# Patient Record
Sex: FEMALE | Race: WHITE | Employment: FULL TIME | URBAN - METROPOLITAN AREA
[De-identification: names, ages, dates, MRNs, and addresses within clinical notes are randomized per-mention and may not be internally consistent; named-entity substitution may affect disease eponyms.]

---

## 2017-01-16 RX ORDER — IBUPROFEN 800 MG/1
TABLET ORAL
Qty: 270 TABLET | Refills: 1 | Status: SHIPPED | OUTPATIENT
Start: 2017-01-16 | End: 2018-02-18 | Stop reason: SDUPTHER

## 2017-01-21 DIAGNOSIS — K21.9 GASTROESOPHAGEAL REFLUX DISEASE WITHOUT ESOPHAGITIS: ICD-10-CM

## 2017-01-23 RX ORDER — OMEPRAZOLE 40 MG/1
CAPSULE, DELAYED RELEASE ORAL
Qty: 90 CAPSULE | Refills: 2 | Status: SHIPPED | OUTPATIENT
Start: 2017-01-23 | End: 2017-10-18 | Stop reason: SDUPTHER

## 2017-03-22 ENCOUNTER — TELEPHONE (OUTPATIENT)
Dept: FAMILY MEDICINE CLINIC | Age: 47
End: 2017-03-22

## 2017-03-22 RX ORDER — KETOCONAZOLE 20 MG/G
CREAM TOPICAL
Qty: 30 G | Refills: 1 | Status: SHIPPED | OUTPATIENT
Start: 2017-03-22 | End: 2019-03-04 | Stop reason: SDUPTHER

## 2017-04-10 DIAGNOSIS — G25.81 RESTLESS LEG SYNDROME: ICD-10-CM

## 2017-04-10 RX ORDER — CLONAZEPAM 2 MG/1
2 TABLET ORAL NIGHTLY PRN
Qty: 90 TABLET | OUTPATIENT
Start: 2017-04-10

## 2017-04-17 ENCOUNTER — OFFICE VISIT (OUTPATIENT)
Dept: FAMILY MEDICINE CLINIC | Age: 47
End: 2017-04-17

## 2017-04-17 VITALS
HEART RATE: 70 BPM | OXYGEN SATURATION: 98 % | SYSTOLIC BLOOD PRESSURE: 140 MMHG | BODY MASS INDEX: 36.7 KG/M2 | WEIGHT: 210.5 LBS | RESPIRATION RATE: 16 BRPM | DIASTOLIC BLOOD PRESSURE: 86 MMHG

## 2017-04-17 DIAGNOSIS — F33.0 MILD EPISODE OF RECURRENT MAJOR DEPRESSIVE DISORDER (HCC): Primary | ICD-10-CM

## 2017-04-17 DIAGNOSIS — F41.9 ANXIETY: ICD-10-CM

## 2017-04-17 DIAGNOSIS — E66.01 MORBID OBESITY DUE TO EXCESS CALORIES (HCC): ICD-10-CM

## 2017-04-17 DIAGNOSIS — G25.81 RESTLESS LEG SYNDROME: ICD-10-CM

## 2017-04-17 PROCEDURE — 99214 OFFICE O/P EST MOD 30 MIN: CPT | Performed by: FAMILY MEDICINE

## 2017-04-17 RX ORDER — CLONAZEPAM 2 MG/1
2 TABLET ORAL NIGHTLY PRN
Qty: 90 TABLET | Refills: 1 | Status: SHIPPED | OUTPATIENT
Start: 2017-04-17 | End: 2017-10-11 | Stop reason: SDUPTHER

## 2017-06-04 ENCOUNTER — TELEPHONE (OUTPATIENT)
Dept: BARIATRICS/WEIGHT MGMT | Age: 47
End: 2017-06-04

## 2017-07-13 RX ORDER — SERTRALINE HYDROCHLORIDE 100 MG/1
TABLET, FILM COATED ORAL
Qty: 135 TABLET | Refills: 1 | Status: SHIPPED | OUTPATIENT
Start: 2017-07-13 | End: 2018-02-18 | Stop reason: SDUPTHER

## 2017-08-10 ENCOUNTER — TELEPHONE (OUTPATIENT)
Dept: FAMILY MEDICINE CLINIC | Age: 47
End: 2017-08-10

## 2017-08-10 RX ORDER — DESVENLAFAXINE 100 MG/1
100 TABLET, EXTENDED RELEASE ORAL DAILY
Qty: 30 TABLET | Refills: 3 | Status: SHIPPED | OUTPATIENT
Start: 2017-08-10 | End: 2017-08-21

## 2017-08-21 ENCOUNTER — TELEPHONE (OUTPATIENT)
Dept: FAMILY MEDICINE CLINIC | Age: 47
End: 2017-08-21

## 2017-09-18 ENCOUNTER — TELEPHONE (OUTPATIENT)
Dept: FAMILY MEDICINE CLINIC | Age: 47
End: 2017-09-18

## 2017-09-18 DIAGNOSIS — Z11.3 SCREENING FOR STD (SEXUALLY TRANSMITTED DISEASE): Primary | ICD-10-CM

## 2017-09-26 ENCOUNTER — TELEPHONE (OUTPATIENT)
Dept: FAMILY MEDICINE CLINIC | Age: 47
End: 2017-09-26

## 2017-10-05 LAB
A/G RATIO: 1.6 (CALC) (ref 1–2.5)
ALBUMIN SERPL-MCNC: 3.9 G/DL (ref 3.6–5.1)
ALP BLD-CCNC: 71 U/L (ref 33–115)
ALT SERPL-CCNC: 12 U/L (ref 6–29)
AST SERPL-CCNC: 15 U/L (ref 10–35)
BASOPHILS ABSOLUTE: 78 CELLS/UL (ref 0–200)
BASOPHILS RELATIVE PERCENT: 1 %
BILIRUB SERPL-MCNC: 0.3 MG/DL (ref 0.2–1.2)
BUN / CREAT RATIO: NORMAL (CALC) (ref 6–22)
BUN BLDV-MCNC: 16 MG/DL (ref 7–25)
CALCIUM SERPL-MCNC: 9.3 MG/DL (ref 8.6–10.2)
CHLORIDE BLD-SCNC: 108 MMOL/L (ref 98–110)
CHOLESTEROL, TOTAL: 230 MG/DL
CHOLESTEROL/HDL RATIO: 2.2 (CALC)
CHOLESTEROL: 125 MG/DL (CALC)
CO2: 28 MMOL/L (ref 20–31)
CREAT SERPL-MCNC: 0.73 MG/DL (ref 0.5–1.1)
EOSINOPHILS ABSOLUTE: 140 CELLS/UL (ref 15–500)
EOSINOPHILS RELATIVE PERCENT: 1.8 %
GFR AFRICAN AMERICAN: 112 ML/MIN/1.73M2
GFR SERPL CREATININE-BSD FRML MDRD: 97 ML/MIN/1.73M2
GLOBULIN: 2.5 G/DL (CALC) (ref 1.9–3.7)
GLUCOSE BLD-MCNC: 95 MG/DL (ref 65–99)
HCT VFR BLD CALC: 36.9 % (ref 35–45)
HDLC SERPL-MCNC: 105 MG/DL
HEMOGLOBIN: 11.8 G/DL (ref 11.7–15.5)
HIV 1+2 AB+HIV1P24 AG, EIA: NORMAL
LDL CHOLESTEROL CALCULATED: 106 MG/DL (CALC)
LYMPHOCYTES ABSOLUTE: 2535 CELLS/UL (ref 850–3900)
LYMPHOCYTES RELATIVE PERCENT: 32.5 %
MCH RBC QN AUTO: 28.6 PG (ref 27–33)
MCHC RBC AUTO-ENTMCNC: 32 G/DL (ref 32–36)
MCV RBC AUTO: 89.6 FL (ref 80–100)
MONOCYTES ABSOLUTE: 562 CELLS/UL (ref 200–950)
MONOCYTES RELATIVE PERCENT: 7.2 %
NEUTROPHILS ABSOLUTE: 4485 CELLS/UL (ref 1500–7800)
PDW BLD-RTO: 13.2 % (ref 11–15)
PLATELET # BLD: 206 THOUSAND/UL (ref 140–400)
PMV BLD AUTO: 11 FL (ref 7.5–12.5)
POTASSIUM SERPL-SCNC: 3.7 MMOL/L (ref 3.5–5.3)
RBC # BLD: 4.12 MILLION/UL (ref 3.8–5.1)
RPR: NORMAL
SEGMENTED NEUTROPHILS RELATIVE PERCENT: 57.5 %
SODIUM BLD-SCNC: 141 MMOL/L (ref 135–146)
TOTAL PROTEIN: 6.4 G/DL (ref 6.1–8.1)
TRIGL SERPL-MCNC: 95 MG/DL
TSH ULTRASENSITIVE: 2.29 MIU/L
WBC # BLD: 7.8 THOUSAND/UL (ref 3.8–10.8)

## 2017-10-11 ENCOUNTER — TELEPHONE (OUTPATIENT)
Dept: FAMILY MEDICINE CLINIC | Age: 47
End: 2017-10-11

## 2017-10-11 DIAGNOSIS — G25.81 RESTLESS LEG SYNDROME: ICD-10-CM

## 2017-10-11 RX ORDER — CLONAZEPAM 2 MG/1
2 TABLET ORAL NIGHTLY PRN
Qty: 30 TABLET | Refills: 0 | OUTPATIENT
Start: 2017-10-11 | End: 2017-10-18 | Stop reason: SDUPTHER

## 2017-10-18 ENCOUNTER — OFFICE VISIT (OUTPATIENT)
Dept: FAMILY MEDICINE CLINIC | Age: 47
End: 2017-10-18

## 2017-10-18 VITALS
WEIGHT: 225.5 LBS | HEART RATE: 82 BPM | RESPIRATION RATE: 12 BRPM | OXYGEN SATURATION: 97 % | DIASTOLIC BLOOD PRESSURE: 70 MMHG | HEIGHT: 64 IN | BODY MASS INDEX: 38.5 KG/M2 | SYSTOLIC BLOOD PRESSURE: 128 MMHG

## 2017-10-18 DIAGNOSIS — K21.9 GASTROESOPHAGEAL REFLUX DISEASE WITHOUT ESOPHAGITIS: ICD-10-CM

## 2017-10-18 DIAGNOSIS — G25.81 RESTLESS LEG SYNDROME: ICD-10-CM

## 2017-10-18 DIAGNOSIS — F41.9 ANXIETY: Primary | ICD-10-CM

## 2017-10-18 PROCEDURE — 99214 OFFICE O/P EST MOD 30 MIN: CPT | Performed by: FAMILY MEDICINE

## 2017-10-18 RX ORDER — OMEPRAZOLE 40 MG/1
CAPSULE, DELAYED RELEASE ORAL
Qty: 90 CAPSULE | Refills: 3 | Status: SHIPPED | OUTPATIENT
Start: 2017-10-18 | End: 2018-09-19 | Stop reason: SDUPTHER

## 2017-10-18 RX ORDER — CLONAZEPAM 2 MG/1
2 TABLET ORAL NIGHTLY
Qty: 90 TABLET | Refills: 1 | Status: SHIPPED | OUTPATIENT
Start: 2017-10-18 | End: 2018-01-24 | Stop reason: SDUPTHER

## 2017-10-18 ASSESSMENT — PATIENT HEALTH QUESTIONNAIRE - PHQ9
SUM OF ALL RESPONSES TO PHQ QUESTIONS 1-9: 0
SUM OF ALL RESPONSES TO PHQ9 QUESTIONS 1 & 2: 0
1. LITTLE INTEREST OR PLEASURE IN DOING THINGS: 0
2. FEELING DOWN, DEPRESSED OR HOPELESS: 0

## 2017-10-18 NOTE — PROGRESS NOTES
Antibiotics Itching, Nausea Only and Rash       Social History   Substance Use Topics    Smoking status: Former Smoker     Years: 13.00     Quit date: 3/28/2012    Smokeless tobacco: Never Used    Alcohol use No       /70 (Site: Left Arm, Position: Sitting, Cuff Size: Large Adult)   Pulse 82   Resp 12   Ht 5' 3.75\" (1.619 m)   Wt 225 lb 8 oz (102.3 kg)   SpO2 97%   BMI 39.01 kg/m²     Objective:   Physical Exam   Constitutional: She appears well-developed and well-nourished. She is cooperative. No distress. Neck: Carotid bruit is not present. No thyromegaly present. Cardiovascular: Normal rate, regular rhythm and normal heart sounds. No murmur heard. Pulses:       Dorsalis pedis pulses are 2+ on the right side, and 2+ on the left side. Posterior tibial pulses are 2+ on the right side, and 2+ on the left side. Pulmonary/Chest: Effort normal and breath sounds normal.   Musculoskeletal: Normal range of motion. She exhibits no edema or tenderness. Neurological: She is alert. Psychiatric: She has a normal mood and affect. Her speech is normal and behavior is normal. Judgment and thought content normal. Cognition and memory are normal.       Assessment:      Velia Nolasco was seen today for follow-up. Diagnoses and all orders for this visit:    Anxiety    Gastroesophageal reflux disease without esophagitis  -     omeprazole (PRILOSEC) 40 MG delayed release capsule; TAKE 1 CAPSULE DAILY    Restless leg syndrome  -     clonazePAM (KLONOPIN) 2 MG tablet; Take 1 tablet by mouth nightly    OARRS report checked          Plan:      Laboratory profile reviewed with patient  Continue current medications  Encourage patient to resume diet and exercise for weight loss  Patient is to consider bariatric evaluation  RTC 6 months  Please note that this chart was generated using Dragon dictation software.  Although every effort was made to ensure the accuracy of this automated transcription, some errors in transcription may have occurred.

## 2018-01-24 ENCOUNTER — OFFICE VISIT (OUTPATIENT)
Dept: FAMILY MEDICINE CLINIC | Age: 48
End: 2018-01-24

## 2018-01-24 VITALS
DIASTOLIC BLOOD PRESSURE: 94 MMHG | OXYGEN SATURATION: 99 % | HEART RATE: 74 BPM | TEMPERATURE: 100.8 F | SYSTOLIC BLOOD PRESSURE: 138 MMHG

## 2018-01-24 DIAGNOSIS — J11.1 INFLUENZA: Primary | ICD-10-CM

## 2018-01-24 DIAGNOSIS — G25.81 RESTLESS LEG SYNDROME: ICD-10-CM

## 2018-01-24 PROCEDURE — 99213 OFFICE O/P EST LOW 20 MIN: CPT | Performed by: FAMILY MEDICINE

## 2018-01-24 RX ORDER — CLONAZEPAM 2 MG/1
2 TABLET ORAL NIGHTLY
Qty: 90 TABLET | Refills: 0 | Status: SHIPPED | OUTPATIENT
Start: 2018-01-24 | End: 2018-04-18 | Stop reason: SDUPTHER

## 2018-01-24 ASSESSMENT — ENCOUNTER SYMPTOMS: COUGH: 1

## 2018-02-19 RX ORDER — IBUPROFEN 800 MG/1
TABLET ORAL
Qty: 270 TABLET | Refills: 1 | Status: SHIPPED | OUTPATIENT
Start: 2018-02-19 | End: 2019-03-01 | Stop reason: SDUPTHER

## 2018-02-19 RX ORDER — SERTRALINE HYDROCHLORIDE 100 MG/1
TABLET, FILM COATED ORAL
Qty: 135 TABLET | Refills: 1 | Status: SHIPPED | OUTPATIENT
Start: 2018-02-19 | End: 2018-04-18 | Stop reason: SDUPTHER

## 2018-02-27 ENCOUNTER — HOSPITAL ENCOUNTER (OUTPATIENT)
Dept: OTHER | Age: 48
Discharge: HOME | End: 2018-02-27
Attending: FAMILY MEDICINE | Admitting: FAMILY MEDICINE

## 2018-02-27 ENCOUNTER — OFFICE VISIT (OUTPATIENT)
Dept: FAMILY MEDICINE CLINIC | Age: 48
End: 2018-02-27

## 2018-02-27 VITALS
WEIGHT: 244.38 LBS | SYSTOLIC BLOOD PRESSURE: 126 MMHG | OXYGEN SATURATION: 98 % | DIASTOLIC BLOOD PRESSURE: 82 MMHG | RESPIRATION RATE: 16 BRPM | HEART RATE: 74 BPM | BODY MASS INDEX: 42.28 KG/M2

## 2018-02-27 DIAGNOSIS — M79.672 PAIN IN LEFT FOOT: ICD-10-CM

## 2018-02-27 DIAGNOSIS — M79.672 LEFT FOOT PAIN: Primary | ICD-10-CM

## 2018-02-27 PROCEDURE — 99213 OFFICE O/P EST LOW 20 MIN: CPT | Performed by: FAMILY MEDICINE

## 2018-03-01 ENCOUNTER — TELEPHONE (OUTPATIENT)
Dept: FAMILY MEDICINE CLINIC | Age: 48
End: 2018-03-01

## 2018-04-02 ENCOUNTER — TELEPHONE (OUTPATIENT)
Dept: FAMILY MEDICINE CLINIC | Age: 48
End: 2018-04-02

## 2018-04-02 DIAGNOSIS — S92.345D CLOSED NONDISPLACED FRACTURE OF FOURTH METATARSAL BONE OF LEFT FOOT WITH ROUTINE HEALING, SUBSEQUENT ENCOUNTER: Primary | ICD-10-CM

## 2018-04-04 ENCOUNTER — HOSPITAL ENCOUNTER (OUTPATIENT)
Dept: OTHER | Age: 48
Discharge: HOME | End: 2018-04-04
Attending: FAMILY MEDICINE | Admitting: FAMILY MEDICINE

## 2018-04-04 DIAGNOSIS — S92.345D CLOSED NONDISPLACED FRACTURE OF FOURTH METATARSAL BONE OF LEFT FOOT WITH ROUTINE HEALING, SUBSEQUENT ENCOUNTER: ICD-10-CM

## 2018-04-05 ENCOUNTER — TELEPHONE (OUTPATIENT)
Dept: FAMILY MEDICINE CLINIC | Age: 48
End: 2018-04-05

## 2018-04-05 DIAGNOSIS — M79.672 LEFT FOOT PAIN: Primary | ICD-10-CM

## 2018-04-18 ENCOUNTER — OFFICE VISIT (OUTPATIENT)
Dept: FAMILY MEDICINE CLINIC | Age: 48
End: 2018-04-18

## 2018-04-18 VITALS
SYSTOLIC BLOOD PRESSURE: 132 MMHG | OXYGEN SATURATION: 98 % | WEIGHT: 241 LBS | BODY MASS INDEX: 41.69 KG/M2 | HEART RATE: 90 BPM | DIASTOLIC BLOOD PRESSURE: 86 MMHG

## 2018-04-18 DIAGNOSIS — F41.9 ANXIETY: ICD-10-CM

## 2018-04-18 DIAGNOSIS — F33.0 MILD EPISODE OF RECURRENT MAJOR DEPRESSIVE DISORDER (HCC): Primary | ICD-10-CM

## 2018-04-18 DIAGNOSIS — B37.2 MONILIAL INTERTRIGO: ICD-10-CM

## 2018-04-18 DIAGNOSIS — M72.2 PLANTAR FASCIITIS: ICD-10-CM

## 2018-04-18 DIAGNOSIS — G25.81 RESTLESS LEG SYNDROME: ICD-10-CM

## 2018-04-18 PROCEDURE — 99214 OFFICE O/P EST MOD 30 MIN: CPT | Performed by: FAMILY MEDICINE

## 2018-04-18 RX ORDER — MELOXICAM 15 MG/1
15 TABLET ORAL DAILY
Qty: 30 TABLET | Refills: 1 | Status: SHIPPED | OUTPATIENT
Start: 2018-04-18 | End: 2019-03-29

## 2018-04-18 RX ORDER — CLONAZEPAM 2 MG/1
2 TABLET ORAL NIGHTLY
Qty: 90 TABLET | Refills: 0 | Status: SHIPPED | OUTPATIENT
Start: 2018-04-18 | End: 2018-10-17 | Stop reason: SDUPTHER

## 2018-04-18 RX ORDER — SERTRALINE HYDROCHLORIDE 100 MG/1
200 TABLET, FILM COATED ORAL DAILY
Qty: 180 TABLET | Refills: 1 | Status: SHIPPED | OUTPATIENT
Start: 2018-04-18 | End: 2018-12-30 | Stop reason: SDUPTHER

## 2018-09-19 DIAGNOSIS — K21.9 GASTROESOPHAGEAL REFLUX DISEASE WITHOUT ESOPHAGITIS: ICD-10-CM

## 2018-09-19 RX ORDER — OMEPRAZOLE 40 MG/1
CAPSULE, DELAYED RELEASE ORAL
Qty: 90 CAPSULE | Refills: 0 | Status: SHIPPED | OUTPATIENT
Start: 2018-09-19 | End: 2018-12-18 | Stop reason: SDUPTHER

## 2018-10-17 ENCOUNTER — OFFICE VISIT (OUTPATIENT)
Dept: FAMILY MEDICINE CLINIC | Age: 48
End: 2018-10-17
Payer: COMMERCIAL

## 2018-10-17 ENCOUNTER — TELEPHONE (OUTPATIENT)
Dept: FAMILY MEDICINE CLINIC | Age: 48
End: 2018-10-17

## 2018-10-17 VITALS
DIASTOLIC BLOOD PRESSURE: 84 MMHG | BODY MASS INDEX: 43.6 KG/M2 | HEART RATE: 86 BPM | WEIGHT: 252 LBS | OXYGEN SATURATION: 98 % | SYSTOLIC BLOOD PRESSURE: 120 MMHG

## 2018-10-17 DIAGNOSIS — L82.1 SEBORRHEIC KERATOSIS: ICD-10-CM

## 2018-10-17 DIAGNOSIS — F41.9 ANXIETY: Primary | ICD-10-CM

## 2018-10-17 DIAGNOSIS — E66.01 CLASS 3 SEVERE OBESITY DUE TO EXCESS CALORIES WITHOUT SERIOUS COMORBIDITY WITH BODY MASS INDEX (BMI) OF 40.0 TO 44.9 IN ADULT (HCC): ICD-10-CM

## 2018-10-17 DIAGNOSIS — G25.81 RESTLESS LEG SYNDROME: ICD-10-CM

## 2018-10-17 PROCEDURE — 99214 OFFICE O/P EST MOD 30 MIN: CPT | Performed by: FAMILY MEDICINE

## 2018-10-17 RX ORDER — CLONAZEPAM 2 MG/1
2 TABLET ORAL NIGHTLY
Qty: 90 TABLET | Refills: 1 | Status: SHIPPED | OUTPATIENT
Start: 2018-10-17 | End: 2018-10-18 | Stop reason: SDUPTHER

## 2018-10-17 RX ORDER — BUPROPION HYDROCHLORIDE 150 MG/1
150 TABLET ORAL EVERY MORNING
Qty: 90 TABLET | Refills: 1 | Status: SHIPPED | OUTPATIENT
Start: 2018-10-17 | End: 2018-10-18

## 2018-10-17 NOTE — TELEPHONE ENCOUNTER
buPROPion (WELLBUTRIN XL) 150 MG extended release tablet 90 tablet 1 10/17/2018     Sig - Route: Take 1 tablet by mouth every morning - Oral          Juventino Kaiser from [address] states that pt has had bariatric surgery and may have problem with absorption of above med. Would like call back to discuss.       P:1- [phone]    Ref #[id number]

## 2018-10-18 DIAGNOSIS — G25.81 RESTLESS LEG SYNDROME: ICD-10-CM

## 2018-10-18 RX ORDER — CLONAZEPAM 2 MG/1
2 TABLET ORAL NIGHTLY
Qty: 90 TABLET | Refills: 1 | Status: SHIPPED | OUTPATIENT
Start: 2018-10-18 | End: 2019-03-29 | Stop reason: SDUPTHER

## 2018-10-18 RX ORDER — BUPROPION HYDROCHLORIDE 75 MG/1
75 TABLET ORAL 2 TIMES DAILY
Qty: 180 TABLET | Refills: 3 | Status: SHIPPED | OUTPATIENT
Start: 2018-10-18 | End: 2019-09-10 | Stop reason: SDUPTHER

## 2018-10-18 NOTE — TELEPHONE ENCOUNTER
Disp Refills Start End    clonazePAM (KLONOPIN) 2 MG tablet 90 tablet 1 10/17/2018 1/15/2019    Sig - Route: Take 1 tablet by mouth nightly for 90 days. . - Oral      [facility]

## 2018-10-19 DIAGNOSIS — G25.81 RESTLESS LEG SYNDROME: ICD-10-CM

## 2018-12-18 DIAGNOSIS — K21.9 GASTROESOPHAGEAL REFLUX DISEASE WITHOUT ESOPHAGITIS: ICD-10-CM

## 2018-12-18 RX ORDER — OMEPRAZOLE 40 MG/1
CAPSULE, DELAYED RELEASE ORAL
Qty: 90 CAPSULE | Refills: 0 | Status: SHIPPED | OUTPATIENT
Start: 2018-12-18 | End: 2019-03-18 | Stop reason: SDUPTHER

## 2018-12-31 RX ORDER — SERTRALINE HYDROCHLORIDE 100 MG/1
TABLET, FILM COATED ORAL
Qty: 180 TABLET | Refills: 1 | Status: SHIPPED | OUTPATIENT
Start: 2018-12-31 | End: 2019-03-29 | Stop reason: SDUPTHER

## 2019-03-04 RX ORDER — IBUPROFEN 800 MG/1
TABLET ORAL
Qty: 270 TABLET | Refills: 0 | Status: SHIPPED | OUTPATIENT
Start: 2019-03-04 | End: 2019-08-01 | Stop reason: ALTCHOICE

## 2019-03-05 RX ORDER — KETOCONAZOLE 20 MG/G
CREAM TOPICAL
Qty: 15 G | Refills: 0 | Status: SHIPPED | OUTPATIENT
Start: 2019-03-05 | End: 2019-08-28 | Stop reason: SDUPTHER

## 2019-03-11 DIAGNOSIS — G25.81 RESTLESS LEG SYNDROME: ICD-10-CM

## 2019-03-12 RX ORDER — CLONAZEPAM 2 MG/1
2 TABLET ORAL NIGHTLY
Qty: 90 TABLET | Refills: 0 | OUTPATIENT
Start: 2019-03-12 | End: 2019-06-10

## 2019-03-18 DIAGNOSIS — K21.9 GASTROESOPHAGEAL REFLUX DISEASE WITHOUT ESOPHAGITIS: ICD-10-CM

## 2019-03-19 RX ORDER — OMEPRAZOLE 40 MG/1
CAPSULE, DELAYED RELEASE ORAL
Qty: 90 CAPSULE | Refills: 0 | Status: SHIPPED | OUTPATIENT
Start: 2019-03-19 | End: 2019-06-16 | Stop reason: SDUPTHER

## 2019-03-29 ENCOUNTER — OFFICE VISIT (OUTPATIENT)
Dept: FAMILY MEDICINE CLINIC | Age: 49
End: 2019-03-29
Payer: COMMERCIAL

## 2019-03-29 VITALS
WEIGHT: 258.5 LBS | SYSTOLIC BLOOD PRESSURE: 142 MMHG | RESPIRATION RATE: 16 BRPM | OXYGEN SATURATION: 98 % | DIASTOLIC BLOOD PRESSURE: 90 MMHG | BODY MASS INDEX: 44.72 KG/M2 | HEART RATE: 75 BPM

## 2019-03-29 DIAGNOSIS — F33.0 MILD EPISODE OF RECURRENT MAJOR DEPRESSIVE DISORDER (HCC): ICD-10-CM

## 2019-03-29 DIAGNOSIS — G25.81 RESTLESS LEG SYNDROME: Primary | ICD-10-CM

## 2019-03-29 DIAGNOSIS — S86.912A KNEE STRAIN, LEFT, INITIAL ENCOUNTER: ICD-10-CM

## 2019-03-29 DIAGNOSIS — B37.2 MONILIAL INTERTRIGO: ICD-10-CM

## 2019-03-29 PROCEDURE — 99214 OFFICE O/P EST MOD 30 MIN: CPT | Performed by: FAMILY MEDICINE

## 2019-03-29 RX ORDER — CLONAZEPAM 2 MG/1
2 TABLET ORAL NIGHTLY
Qty: 90 TABLET | Refills: 0 | Status: SHIPPED | OUTPATIENT
Start: 2019-03-29 | End: 2019-06-19 | Stop reason: SDUPTHER

## 2019-03-29 RX ORDER — SERTRALINE HYDROCHLORIDE 100 MG/1
TABLET, FILM COATED ORAL
Qty: 180 TABLET | Refills: 1 | Status: SHIPPED | OUTPATIENT
Start: 2019-03-29 | End: 2020-08-04 | Stop reason: SDUPTHER

## 2019-04-04 DIAGNOSIS — D50.9 IRON DEFICIENCY ANEMIA, UNSPECIFIED IRON DEFICIENCY ANEMIA TYPE: Primary | ICD-10-CM

## 2019-04-04 LAB
A/G RATIO: 1.3 (CALC) (ref 1–2.5)
ALBUMIN SERPL-MCNC: 3.8 G/DL (ref 3.6–5.1)
ALP BLD-CCNC: 74 U/L (ref 33–130)
ALT SERPL-CCNC: 10 U/L (ref 6–29)
AST SERPL-CCNC: 15 U/L (ref 10–35)
BILIRUB SERPL-MCNC: 0.3 MG/DL (ref 0.2–1.2)
BUN / CREAT RATIO: NORMAL (CALC) (ref 6–22)
BUN BLDV-MCNC: 13 MG/DL (ref 7–25)
CALCIUM SERPL-MCNC: 8.9 MG/DL (ref 8.6–10.4)
CHLORIDE BLD-SCNC: 104 MMOL/L (ref 98–110)
CHOLESTEROL, TOTAL: 217 MG/DL
CHOLESTEROL/HDL RATIO: 2.3 (CALC)
CHOLESTEROL: 124 MG/DL (CALC)
CO2: 26 MMOL/L (ref 20–32)
CREAT SERPL-MCNC: 0.8 MG/DL (ref 0.5–1.05)
FERRITIN: 9 NG/ML (ref 10–232)
GFR AFRICAN AMERICAN: 100 ML/MIN/1.73M2
GFR, ESTIMATED: 86 ML/MIN/1.73M2
GLOBULIN: 3 G/DL (CALC) (ref 1.9–3.7)
GLUCOSE BLD-MCNC: 91 MG/DL (ref 65–99)
HCT VFR BLD CALC: 37.1 % (ref 35–45)
HDLC SERPL-MCNC: 93 MG/DL
HEMOGLOBIN: 11.4 G/DL (ref 11.7–15.5)
HIV 1+2 AB+HIV1P24 AG, EIA: NORMAL
IRON SATURATION: 7 % (CALC) (ref 11–50)
IRON: 35 MCG/DL (ref 45–160)
LDL CHOLESTEROL CALCULATED: 100 MG/DL (CALC)
MCH RBC QN AUTO: 24.5 PG (ref 27–33)
MCHC RBC AUTO-ENTMCNC: 30.7 G/DL (ref 32–36)
MCV RBC AUTO: 79.8 FL (ref 80–100)
PDW BLD-RTO: 16 % (ref 11–15)
PLATELET # BLD: 260 THOUSAND/UL (ref 140–400)
PMV BLD AUTO: 10.8 FL (ref 7.5–12.5)
POTASSIUM SERPL-SCNC: 3.7 MMOL/L (ref 3.5–5.3)
RBC # BLD: 4.65 MILLION/UL (ref 3.8–5.1)
SODIUM BLD-SCNC: 140 MMOL/L (ref 135–146)
TOTAL IRON BINDING CAPACITY: 503 MCG/DL (CALC) (ref 250–450)
TOTAL PROTEIN: 6.8 G/DL (ref 6.1–8.1)
TRIGL SERPL-MCNC: 139 MG/DL
TSH ULTRASENSITIVE: 3.62 MIU/L
WBC # BLD: 7.9 THOUSAND/UL (ref 3.8–10.8)

## 2019-05-15 ENCOUNTER — TELEPHONE (OUTPATIENT)
Dept: FAMILY MEDICINE CLINIC | Age: 49
End: 2019-05-15

## 2019-05-24 ENCOUNTER — TELEPHONE (OUTPATIENT)
Dept: FAMILY MEDICINE CLINIC | Age: 49
End: 2019-05-24

## 2019-05-24 NOTE — TELEPHONE ENCOUNTER
Patient would like to start taking herbal joint health supplement, glucosamine with tumeric, and wanted to make sure that was okay with her medications. She is at work and says it is okay to leave response on her voice mail.

## 2019-06-16 DIAGNOSIS — K21.9 GASTROESOPHAGEAL REFLUX DISEASE WITHOUT ESOPHAGITIS: ICD-10-CM

## 2019-06-18 RX ORDER — OMEPRAZOLE 40 MG/1
CAPSULE, DELAYED RELEASE ORAL
Qty: 90 CAPSULE | Refills: 1 | Status: SHIPPED | OUTPATIENT
Start: 2019-06-18 | End: 2019-09-11 | Stop reason: SDUPTHER

## 2019-06-19 DIAGNOSIS — G25.81 RESTLESS LEG SYNDROME: ICD-10-CM

## 2019-06-19 RX ORDER — CLONAZEPAM 2 MG/1
2 TABLET ORAL NIGHTLY
Qty: 90 TABLET | Refills: 0 | Status: SHIPPED | OUTPATIENT
Start: 2019-06-19 | End: 2019-08-01

## 2019-06-19 NOTE — TELEPHONE ENCOUNTER
Medication:   Requested Prescriptions     Pending Prescriptions Disp Refills    clonazePAM (KLONOPIN) 2 MG tablet 90 tablet 0     Sig: Take 1 tablet by mouth nightly for 90 days. Last Filled: 3-29    Patient Phone Number: [phone] (home) [phone] (work)    Last appt: 3/29/2019   Next appt: 8/1/2019    Last OARRS:   RX Monitoring 3/29/2019   Attestation The Prescription Monitoring Report for this patient was reviewed today.    Periodic Controlled Substance Monitoring -       Preferred Pharmacy:   [facility] [address], [address] [phone] Brina [place] [phone] [address] [facility] [address]  [place] [address]  Phone: [phone] Fax: [phone] [facility], [address] [facility] [phone] Brina [place] [phone]  [facility] [address]  Phone: [phone] Fax: [phone]

## 2019-06-20 ENCOUNTER — TELEPHONE (OUTPATIENT)
Dept: FAMILY MEDICINE CLINIC | Age: 49
End: 2019-06-20

## 2019-06-20 NOTE — TELEPHONE ENCOUNTER
Herpes is typically only past sexually.   Obviously normal laundry detergent is sufficient washing clothes and towels

## 2019-08-01 ENCOUNTER — OFFICE VISIT (OUTPATIENT)
Dept: FAMILY MEDICINE CLINIC | Age: 49
End: 2019-08-01
Payer: COMMERCIAL

## 2019-08-01 VITALS
HEART RATE: 74 BPM | BODY MASS INDEX: 43.94 KG/M2 | SYSTOLIC BLOOD PRESSURE: 112 MMHG | DIASTOLIC BLOOD PRESSURE: 84 MMHG | HEIGHT: 64 IN | RESPIRATION RATE: 12 BRPM | WEIGHT: 257.38 LBS | OXYGEN SATURATION: 97 %

## 2019-08-01 DIAGNOSIS — G25.81 RESTLESS LEG SYNDROME: Primary | ICD-10-CM

## 2019-08-01 DIAGNOSIS — M23.92 INTERNAL DERANGEMENT OF LEFT KNEE: ICD-10-CM

## 2019-08-01 DIAGNOSIS — F33.0 MILD EPISODE OF RECURRENT MAJOR DEPRESSIVE DISORDER (HCC): ICD-10-CM

## 2019-08-01 DIAGNOSIS — F41.9 ANXIETY: ICD-10-CM

## 2019-08-01 DIAGNOSIS — D50.8 IRON DEFICIENCY ANEMIA SECONDARY TO INADEQUATE DIETARY IRON INTAKE: ICD-10-CM

## 2019-08-01 PROBLEM — D50.9 IRON DEFICIENCY ANEMIA: Status: ACTIVE | Noted: 2019-08-01

## 2019-08-01 PROCEDURE — 99214 OFFICE O/P EST MOD 30 MIN: CPT | Performed by: FAMILY MEDICINE

## 2019-08-01 RX ORDER — FERROUS SULFATE 325(65) MG
325 TABLET ORAL
Qty: 90 TABLET | Refills: 1
Start: 2019-08-01 | End: 2020-08-04 | Stop reason: SDUPTHER

## 2019-08-01 RX ORDER — CLONAZEPAM 2 MG/1
2 TABLET ORAL 2 TIMES DAILY PRN
Qty: 180 TABLET | Refills: 0 | Status: SHIPPED | OUTPATIENT
Start: 2019-08-01 | End: 2019-12-27 | Stop reason: SDUPTHER

## 2019-08-01 NOTE — PROGRESS NOTES
mouth 2 times daily as needed (restless leg syndrome) for up to 90 days. -     Comprehensive Metabolic Panel - Vitros; Future    Iron deficiency anemia secondary to inadequate dietary iron intake  -     CBC; Future  -     Iron and TIBC; Future    Mild episode of recurrent major depressive disorder ([facility] [address]    Anxiety    Internal derangement of left knee    Other orders  -     ferrous sulfate 325 (65 Fe) MG tablet; Take 1 tablet by mouth daily (with breakfast)    OARRS report checked          Plan:      Laboratory profile reviewed with patient demonstrating i improvement of iron deficiency anemia. .  We will see how she response to the oral iron and decrease his down to once a day. In regards to depression she is doing overall okay with her current medications and she does not want any additional medications. She is already working on a plan in regards and shipped to her daughter. Continue with orthopedic care regards to the left knee  RTC in 6 months or sooner if needs refill of the benzodiazepine medication    Please note that this chart was generated using Dragon dictation software. Although every effort was made to ensure the accuracy of this automated transcription, some errors in transcription may have occurred.

## 2019-08-28 ENCOUNTER — TELEPHONE (OUTPATIENT)
Dept: FAMILY MEDICINE CLINIC | Age: 49
End: 2019-08-28

## 2019-08-28 RX ORDER — KETOCONAZOLE 20 MG/G
CREAM TOPICAL
Qty: 15 G | Refills: 0 | Status: SHIPPED | OUTPATIENT
Start: 2019-08-28 | End: 2019-10-07 | Stop reason: SDUPTHER

## 2019-08-28 RX ORDER — SUCRALFATE 1 G/1
1 TABLET ORAL 4 TIMES DAILY
Qty: 120 TABLET | Refills: 3 | Status: SHIPPED | OUTPATIENT
Start: 2019-08-28 | End: 2019-10-25 | Stop reason: SDUPTHER

## 2019-09-10 RX ORDER — BUPROPION HYDROCHLORIDE 75 MG/1
TABLET ORAL
Qty: 180 TABLET | Refills: 1 | Status: SHIPPED | OUTPATIENT
Start: 2019-09-10 | End: 2020-02-04 | Stop reason: SDUPTHER

## 2019-09-11 DIAGNOSIS — K21.9 GASTROESOPHAGEAL REFLUX DISEASE WITHOUT ESOPHAGITIS: ICD-10-CM

## 2019-09-11 RX ORDER — OMEPRAZOLE 40 MG/1
CAPSULE, DELAYED RELEASE ORAL
Qty: 60 CAPSULE | Refills: 1 | Status: SHIPPED | OUTPATIENT
Start: 2019-09-11 | End: 2019-12-26

## 2019-10-07 ENCOUNTER — OFFICE VISIT (OUTPATIENT)
Dept: FAMILY MEDICINE CLINIC | Age: 49
End: 2019-10-07
Payer: COMMERCIAL

## 2019-10-07 VITALS
SYSTOLIC BLOOD PRESSURE: 142 MMHG | DIASTOLIC BLOOD PRESSURE: 94 MMHG | HEART RATE: 88 BPM | TEMPERATURE: 98.7 F | OXYGEN SATURATION: 97 %

## 2019-10-07 DIAGNOSIS — J20.9 ACUTE BRONCHITIS, UNSPECIFIED ORGANISM: Primary | ICD-10-CM

## 2019-10-07 PROCEDURE — 99213 OFFICE O/P EST LOW 20 MIN: CPT | Performed by: FAMILY MEDICINE

## 2019-10-07 RX ORDER — AZITHROMYCIN 500 MG/1
500 TABLET, FILM COATED ORAL DAILY
Qty: 5 TABLET | Refills: 0 | Status: SHIPPED | OUTPATIENT
Start: 2019-10-07 | End: 2019-10-12

## 2019-10-07 RX ORDER — KETOCONAZOLE 20 MG/G
CREAM TOPICAL
Qty: 30 G | Refills: 2 | Status: SHIPPED | OUTPATIENT
Start: 2019-10-07 | End: 2022-06-27 | Stop reason: SDUPTHER

## 2019-10-07 ASSESSMENT — ENCOUNTER SYMPTOMS: COUGH: 1

## 2019-10-25 RX ORDER — SUCRALFATE 1 G/1
1 TABLET ORAL 4 TIMES DAILY
Qty: 360 TABLET | Refills: 1 | Status: SHIPPED | OUTPATIENT
Start: 2019-10-25 | End: 2019-12-01 | Stop reason: SDUPTHER

## 2019-12-02 RX ORDER — SUCRALFATE 1 G/1
TABLET ORAL
Qty: 360 TABLET | Refills: 1 | Status: SHIPPED | OUTPATIENT
Start: 2019-12-02 | End: 2020-08-04 | Stop reason: SDUPTHER

## 2019-12-25 DIAGNOSIS — K21.9 GASTROESOPHAGEAL REFLUX DISEASE WITHOUT ESOPHAGITIS: ICD-10-CM

## 2019-12-26 RX ORDER — OMEPRAZOLE 40 MG/1
CAPSULE, DELAYED RELEASE ORAL
Qty: 90 CAPSULE | Refills: 0 | Status: SHIPPED | OUTPATIENT
Start: 2019-12-26 | End: 2020-02-04 | Stop reason: SDUPTHER

## 2019-12-26 RX ORDER — IBUPROFEN 800 MG/1
TABLET ORAL
Qty: 270 TABLET | Refills: 4 | OUTPATIENT
Start: 2019-12-26

## 2019-12-27 DIAGNOSIS — G25.81 RESTLESS LEG SYNDROME: ICD-10-CM

## 2019-12-27 RX ORDER — CLONAZEPAM 2 MG/1
2 TABLET ORAL 2 TIMES DAILY PRN
Qty: 180 TABLET | Refills: 0 | Status: SHIPPED | OUTPATIENT
Start: 2019-12-27 | End: 2020-02-04 | Stop reason: SDUPTHER

## 2020-02-04 ENCOUNTER — OFFICE VISIT (OUTPATIENT)
Dept: FAMILY MEDICINE CLINIC | Age: 50
End: 2020-02-04
Payer: COMMERCIAL

## 2020-02-04 VITALS
OXYGEN SATURATION: 96 % | HEART RATE: 85 BPM | WEIGHT: 266 LBS | DIASTOLIC BLOOD PRESSURE: 80 MMHG | BODY MASS INDEX: 46.02 KG/M2 | SYSTOLIC BLOOD PRESSURE: 130 MMHG

## 2020-02-04 PROCEDURE — 99214 OFFICE O/P EST MOD 30 MIN: CPT | Performed by: FAMILY MEDICINE

## 2020-02-04 RX ORDER — SERTRALINE HYDROCHLORIDE 100 MG/1
TABLET, FILM COATED ORAL
Qty: 180 TABLET | Refills: 4 | OUTPATIENT
Start: 2020-02-04

## 2020-02-04 RX ORDER — CLONAZEPAM 2 MG/1
2 TABLET ORAL 2 TIMES DAILY PRN
Qty: 180 TABLET | Refills: 1 | Status: SHIPPED | OUTPATIENT
Start: 2020-02-04 | End: 2020-08-04 | Stop reason: SDUPTHER

## 2020-02-04 RX ORDER — OMEPRAZOLE 40 MG/1
CAPSULE, DELAYED RELEASE ORAL
Qty: 90 CAPSULE | Refills: 1 | Status: SHIPPED | OUTPATIENT
Start: 2020-02-04 | End: 2020-08-04 | Stop reason: SDUPTHER

## 2020-02-04 RX ORDER — BUPROPION HYDROCHLORIDE 75 MG/1
TABLET ORAL
Qty: 180 TABLET | Refills: 1 | Status: SHIPPED | OUTPATIENT
Start: 2020-02-04 | End: 2020-08-04 | Stop reason: SDUPTHER

## 2020-02-04 NOTE — PROGRESS NOTES
capsule 0    sucralfate (CARAFATE) 1 GM tablet TAKE 1 TABLET FOUR TIMES A  tablet 1    ketoconazole (NIZORAL) 2 % cream Apply topically daily. 30 g 2    buPROPion (WELLBUTRIN) 75 MG tablet TAKE 1 TABLET TWICE A  tablet 1    ferrous sulfate 325 (65 Fe) MG tablet Take 1 tablet by mouth daily (with breakfast) 90 tablet 1    sertraline (ZOLOFT) 100 MG tablet TAKE 2 TABLETS DAILY 180 tablet 1       Allergies   Allergen Reactions    Glucophage Xr [Metformin Hcl Er] Other (See Comments)     Gastrointestinal upset    Macrobid [Nitrofurantoin Macrocrystal] Diarrhea     chills    Pcn [Penicillins]      unknown    Sulfa Antibiotics Itching, Nausea Only and Rash       Social History     Tobacco Use    Smoking status: Former Smoker     Years: 13.00     Last attempt to quit: 3/28/2012     Years since quittin.8    Smokeless tobacco: Never Used   Substance Use Topics    Alcohol use: No       /80 (Site: Left Upper Arm, Position: Sitting, Cuff Size: Large Adult)   Pulse 85   Wt 266 lb (120.7 kg)   SpO2 96%   BMI 46.02 kg/m²         Objective:   Physical Exam  Vitals signs and nursing note reviewed. Constitutional:       General: She is not in acute distress. Appearance: Normal appearance. She is well-developed and well-groomed. Neck:      Vascular: No carotid bruit. Cardiovascular:      Rate and Rhythm: Normal rate and regular rhythm. Pulses:           Dorsalis pedis pulses are 2+ on the right side and 2+ on the left side. Posterior tibial pulses are 2+ on the right side and 2+ on the left side. Heart sounds: Normal heart sounds. No murmur. No friction rub. No gallop. Pulmonary:      Effort: Pulmonary effort is normal.      Breath sounds: Normal breath sounds. Musculoskeletal:      Right ankle: She exhibits swelling and ecchymosis. She exhibits normal range of motion. Tenderness. Lateral malleolus, AITFL and CF ligament tenderness found.  Achilles tendon normal. Right lower leg: No edema. Left lower leg: No edema. Neurological:      Mental Status: She is alert and oriented to person, place, and time. Sensory: Sensation is intact. Motor: Motor function is intact. Psychiatric:         Attention and Perception: Attention and perception normal.         Mood and Affect: Mood and affect normal.         Speech: Speech normal.         Behavior: Behavior normal. Behavior is cooperative. Thought Content: Thought content normal.         Cognition and Memory: Cognition and memory normal.         Judgment: Judgment normal.         Assessment:      Marilee was seen today for 6 month follow-up and anxiety. Diagnoses and all orders for this visit:    Restless leg syndrome  -     clonazePAM (KLONOPIN) 2 MG tablet; Take 1 tablet by mouth 2 times daily as needed (restless leg syndrome) for up to 180 days. Gastroesophageal reflux disease without esophagitis  -     omeprazole (PRILOSEC) 40 MG delayed release capsule; TAKE 1 CAPSULE DAILY    Sprain of right ankle, unspecified ligament, initial encounter    Iron deficiency anemia secondary to inadequate dietary iron intake    Mild episode of recurrent major depressive disorder (HCC)    Other orders  -     buPROPion (WELLBUTRIN) 75 MG tablet; TAKE 1 TABLET TWICE A DAY    OARRS report checked          Plan:      Pt appears stable & labs ordered. Adjustments of medication will be done after laboratory testing results available. In regards to the ankle sprain patient should continue with ice  Patient received counseling on the following healthy behaviors: nutrition and exercise     Patient given educational materials     Health maintenance updated    Discussed use, benefit, and side effects of prescribed medications. Barriers to medication compliance addressed. All patient questions answered. Pt voiced understanding. Patient needs RTC in 6 months.     Please note that this chart was generated using Dragon dictation software. Although every effort was made to ensure the accuracy of this automated transcription, some errors in transcription may have occurred.

## 2020-02-04 NOTE — PATIENT INSTRUCTIONS
Patient Education        Ankle Sprain: Care Instructions  Your Care Instructions    An ankle sprain can happen when you twist your ankle. The ligaments that support the ankle can get stretched and torn. Often the ankle is swollen and painful. Ankle sprains may take from several weeks to several months to heal. Usually, the more pain and swelling you have, the more severe your ankle sprain is and the longer it will take to heal. You can heal faster and regain strength in your ankle with good home treatment. It is very important to give your ankle time to heal completely, so that you do not easily hurt your ankle again. Follow-up care is a key part of your treatment and safety. Be sure to make and go to all appointments, and call your doctor if you are having problems. It's also a good idea to know your test results and keep a list of the medicines you take. How can you care for yourself at home? · Prop up your foot on pillows as much as possible for the next 3 days. Try to keep your ankle above the level of your heart. This will help reduce the swelling. · Follow your doctor's directions for wearing a splint or elastic bandage. Wrapping the ankle may help reduce or prevent swelling. · Your doctor may give you a splint, a brace, an air stirrup, or another form of ankle support to protect your ankle until it is healed. Wear it as directed while your ankle is healing. Do not remove it unless your doctor tells you to. After your ankle has healed, ask your doctor whether you should wear the brace when you exercise. · Put ice or cold packs on your injured ankle for 10 to 20 minutes at a time. Try to do this every 1 to 2 hours for the next 3 days (when you are awake) or until the swelling goes down. Put a thin cloth between the ice and your skin. · You may need to use crutches until you can walk without pain. If you do use crutches, try to bear some weight on your injured ankle if you can do so without pain.  This

## 2020-02-10 RX ORDER — IBUPROFEN 800 MG/1
TABLET ORAL
Qty: 270 TABLET | Refills: 1 | OUTPATIENT
Start: 2020-02-10

## 2020-02-10 RX ORDER — SERTRALINE HYDROCHLORIDE 100 MG/1
100 TABLET, FILM COATED ORAL DAILY
Qty: 30 TABLET | Refills: 3 | Status: SHIPPED | OUTPATIENT
Start: 2020-02-10 | End: 2020-08-04

## 2020-02-10 RX ORDER — SERTRALINE HYDROCHLORIDE 100 MG/1
100 TABLET, FILM COATED ORAL DAILY
Qty: 10 TABLET | Refills: 0 | Status: SHIPPED | OUTPATIENT
Start: 2020-02-10 | End: 2020-02-11 | Stop reason: SDUPTHER

## 2020-02-10 NOTE — TELEPHONE ENCOUNTER
Patient needs refill [facility] AND needs a 10-day supply sent to local pharmacy to last until delivery received       Disp Refills Start End    sertraline (ZOLOFT) 100 MG tablet 180 tablet 1 3/29/2019     Sig: TAKE 2 TABLETS DAILY      [facility] [place] [facility] [address] [address] [phone] - F [phone]      Provider out of office        Please advise

## 2020-02-11 RX ORDER — SERTRALINE HYDROCHLORIDE 100 MG/1
100 TABLET, FILM COATED ORAL 2 TIMES DAILY
Qty: 180 TABLET | Refills: 1 | Status: SHIPPED | OUTPATIENT
Start: 2020-02-11 | End: 2020-08-04

## 2020-02-11 RX ORDER — IBUPROFEN 800 MG/1
TABLET ORAL
Qty: 270 TABLET | Refills: 0 | Status: SHIPPED | OUTPATIENT
Start: 2020-02-11 | End: 2021-06-01

## 2020-02-11 NOTE — TELEPHONE ENCOUNTER
Pt. States she needs a dosage change on medication sertraline (ZOLOFT) 100 MG tablet [[id number] she is currently taking 2 pills per day and needs a 3 MO supply. She also would like a new prescription for the ibuprofen (ADVIL;MOTRIN) 800 MG tablet [[id number]  She has a sprained ankle and uses them for menstrual cramps.  Please advise

## 2020-02-14 ENCOUNTER — TELEPHONE (OUTPATIENT)
Dept: FAMILY MEDICINE CLINIC | Age: 50
End: 2020-02-14

## 2020-02-14 NOTE — TELEPHONE ENCOUNTER
Pt called due to her Zoloft being called in for 1 tablet daily when she takes 1 twice daily. The script was called to [facility] incorrectly. Patient states she would [facility] for the correct amount of 180. I looked up in her chart and advised her that a new [facility] was [facility] on 2/11/2020 by Fahad Betancourt for the correct amount. Patient will check with [facility] and see if they still have the script for this and will call back if they cancelled that script. I advised her to let us know and that we could send another if need be. She will let us know when she knows something.

## 2020-08-04 ENCOUNTER — OFFICE VISIT (OUTPATIENT)
Dept: FAMILY MEDICINE CLINIC | Age: 50
End: 2020-08-04
Payer: COMMERCIAL

## 2020-08-04 VITALS
DIASTOLIC BLOOD PRESSURE: 82 MMHG | HEIGHT: 64 IN | TEMPERATURE: 96.9 F | HEART RATE: 83 BPM | WEIGHT: 265 LBS | BODY MASS INDEX: 45.24 KG/M2 | SYSTOLIC BLOOD PRESSURE: 136 MMHG | OXYGEN SATURATION: 95 %

## 2020-08-04 PROCEDURE — 99214 OFFICE O/P EST MOD 30 MIN: CPT | Performed by: FAMILY MEDICINE

## 2020-08-04 RX ORDER — CLONAZEPAM 2 MG/1
2 TABLET ORAL 2 TIMES DAILY PRN
Qty: 180 TABLET | Refills: 1 | Status: SHIPPED | OUTPATIENT
Start: 2020-08-04 | End: 2021-06-08 | Stop reason: SDUPTHER

## 2020-08-04 RX ORDER — OMEPRAZOLE 40 MG/1
CAPSULE, DELAYED RELEASE ORAL
Qty: 90 CAPSULE | Refills: 1 | Status: SHIPPED | OUTPATIENT
Start: 2020-08-04 | End: 2021-03-22

## 2020-08-04 RX ORDER — SUCRALFATE 1 G/1
TABLET ORAL
Qty: 360 TABLET | Refills: 1 | Status: SHIPPED | OUTPATIENT
Start: 2020-08-04 | End: 2021-03-22 | Stop reason: SDUPTHER

## 2020-08-04 RX ORDER — SERTRALINE HYDROCHLORIDE 100 MG/1
TABLET, FILM COATED ORAL
Qty: 180 TABLET | Refills: 1 | Status: SHIPPED | OUTPATIENT
Start: 2020-08-04 | End: 2021-02-03

## 2020-08-04 RX ORDER — FERROUS SULFATE 325(65) MG
325 TABLET ORAL
Qty: 90 TABLET | Refills: 1 | Status: SHIPPED | OUTPATIENT
Start: 2020-08-04 | End: 2021-03-22 | Stop reason: SDUPTHER

## 2020-08-04 RX ORDER — BUPROPION HYDROCHLORIDE 75 MG/1
TABLET ORAL
Qty: 180 TABLET | Refills: 1 | Status: SHIPPED | OUTPATIENT
Start: 2020-08-04 | End: 2021-02-03

## 2020-08-04 NOTE — PROGRESS NOTES
Subjective:      Patient ID: Herman Marina is a 44 y.o. female. CC: Patient presents for re-evaluation of chronic health problems including restless leg syndrome, GERD, osteoarthritis and depression. HPI Patient presents today for a follow-up on chronic medications and medical conditions. Patient overall feels she is doing well except for her knee. She was told she had osteoarthritis and she was concerned what all she should do. She had a Synvisc injection feels her knee is improved at this time. She would like to lose some weight and believe this would help her out. She has started a walking program her recent date. Restless leg syndrome is well controlled with current treatment plan. Patient feels her anxiety symptoms are slightly worse with the coronavirus but overall tolerable. She did have a [employer] testing years ago and appears that she is on the correct medications. She has a good support system with her family. Review of Systems     Patient Active Problem List   Diagnosis    Anxiety    Arthritis    Gastroesophageal reflux disease without esophagitis    Depression    Venous (peripheral) insufficiency    PMS (premenstrual syndrome)    Restless leg syndrome    Obesity due to excess calories    Monilial intertrigo    Iron deficiency anemia       Outpatient Medications Marked as Taking for the 8/4/20 encounter (Office Visit) with Kevin Cowart MD   Medication Sig Dispense Refill    ibuprofen (ADVIL;MOTRIN) 800 MG tablet TAKE 1 TABLET THREE TIMES A DAY (Patient taking differently: TAKE 1 TABLET THREE TIMES A DAY prn) 270 tablet 0    omeprazole (PRILOSEC) 40 MG delayed release capsule TAKE 1 CAPSULE DAILY 90 capsule 1    buPROPion (WELLBUTRIN) 75 MG tablet TAKE 1 TABLET TWICE A  tablet 1    sucralfate (CARAFATE) 1 GM tablet TAKE 1 TABLET FOUR TIMES A  tablet 1    ketoconazole (NIZORAL) 2 % cream Apply topically daily.  30 g 2    ferrous sulfate 325 (65 Fe) MG

## 2021-01-12 ENCOUNTER — TELEPHONE (OUTPATIENT)
Dept: FAMILY MEDICINE CLINIC | Age: 51
End: 2021-01-12

## 2021-02-03 RX ORDER — SERTRALINE HYDROCHLORIDE 100 MG/1
TABLET, FILM COATED ORAL
Qty: 180 TABLET | Refills: 0 | Status: SHIPPED | OUTPATIENT
Start: 2021-02-03 | End: 2021-06-08 | Stop reason: SDUPTHER

## 2021-02-03 RX ORDER — BUPROPION HYDROCHLORIDE 75 MG/1
TABLET ORAL
Qty: 180 TABLET | Refills: 0 | Status: SHIPPED | OUTPATIENT
Start: 2021-02-03 | End: 2021-06-08 | Stop reason: SDUPTHER

## 2021-02-03 NOTE — TELEPHONE ENCOUNTER
Medication:   Requested Prescriptions     Pending Prescriptions Disp Refills    buPROPion (WELLBUTRIN) 75 MG tablet [Pharmacy Med Name: BUPROPION HCL TABS 75MG] 180 tablet 3     Sig: TAKE 1 TABLET TWICE A DAY    sertraline (ZOLOFT) 100 MG tablet [Pharmacy Med Name: SERTRALINE HCL TABS 100MG] 180 tablet 3     Sig: TAKE 2 TABLETS DAILY      Last Filled:      Patient Phone Number: [phone] (home) [phone] (work)    Last appt: 8/4/2020   Next appt: Visit date not found    Last OARRS:   RX Monitoring 3/29/2019   Attestation The Prescription Monitoring Report for this patient was reviewed today.    Periodic Controlled Substance Monitoring -     PDMP Monitoring:    Last PDMP Elena Durán as Reviewed [facility]):  Review User Review Instant Review Result   Thailayla Brandtter 8/4/2020  7:34 AM Reviewed PDMP [1]     Preferred Pharmacy:   [facility], [address] [facility] [phone] [facility] [phone]  [address] [place] [address] [address]  [facility] [address]  Phone: [phone] Fax: [phone] - [address] [phone] - f [phone]  [address] [place] [address]  Phone: [phone] Fax: [phone]

## 2021-03-19 DIAGNOSIS — K21.9 GASTROESOPHAGEAL REFLUX DISEASE WITHOUT ESOPHAGITIS: ICD-10-CM

## 2021-03-22 RX ORDER — FERROUS SULFATE 325(65) MG
325 TABLET ORAL
Qty: 90 TABLET | Refills: 0 | Status: SHIPPED | OUTPATIENT
Start: 2021-03-22 | End: 2021-06-08 | Stop reason: SDUPTHER

## 2021-03-22 RX ORDER — SUCRALFATE 1 G/1
TABLET ORAL
Qty: 360 TABLET | Refills: 0 | Status: SHIPPED | OUTPATIENT
Start: 2021-03-22 | End: 2021-06-08 | Stop reason: SDUPTHER

## 2021-03-22 RX ORDER — OMEPRAZOLE 40 MG/1
CAPSULE, DELAYED RELEASE ORAL
Qty: 90 CAPSULE | Refills: 0 | Status: SHIPPED | OUTPATIENT
Start: 2021-03-22 | End: 2021-06-08 | Stop reason: SDUPTHER

## 2021-05-18 ENCOUNTER — TELEPHONE (OUTPATIENT)
Dept: FAMILY MEDICINE CLINIC | Age: 51
End: 2021-05-18

## 2021-05-18 DIAGNOSIS — Z00.00 WELL ADULT EXAM: Primary | ICD-10-CM

## 2021-05-18 DIAGNOSIS — D50.9 IRON DEFICIENCY ANEMIA, UNSPECIFIED IRON DEFICIENCY ANEMIA TYPE: ICD-10-CM

## 2021-05-18 RX ORDER — SERTRALINE HYDROCHLORIDE 100 MG/1
TABLET, FILM COATED ORAL
Qty: 180 TABLET | Refills: 0 | OUTPATIENT
Start: 2021-05-18

## 2021-05-18 NOTE — TELEPHONE ENCOUNTER
Medication:   Requested Prescriptions     Pending Prescriptions Disp Refills    sertraline (ZOLOFT) 100 MG tablet [Pharmacy Med Name: SERTRALINE HCL TABS 100MG] 180 tablet 3     Sig: TAKE 2 TABLETS DAILY      Last Filled:     Patient Phone Number: [phone] (home)     Last appt: 8/4/2020   Next appt:     Last OARRS:   RX Monitoring 3/29/2019   Attestation The Prescription Monitoring Report for this patient was reviewed today.    Periodic Controlled Substance Monitoring -     PDMP Monitoring:    Last PDMP Andi Pleasure as Reviewed [facility]):  Review User Review Instant Review Result   Jose Luis Bloomconchita 8/4/2020  7:34 AM Reviewed PDMP [1]     Preferred Pharmacy:   [facility] [address], [address] [facility] [phone] [facility] [phone] [address] [address]  Phone: [phone] Fax: [phone] [address] [place] [address] [phone] -  [phone]  [address] [place] [address]  Phone: [phone] Fax: [phone]

## 2021-05-18 NOTE — TELEPHONE ENCOUNTER
PT is requesting refill on omeprazole (PRILOSEC) 40 MG delayed release capsule    * clonazePAM (KLONOPIN) 2 MG tablet  To please be sent to [address], [address] [facility]

## 2021-05-18 NOTE — TELEPHONE ENCOUNTER
Please call pt back and schedule appt for med refill. Pt has not been seen since 8/2020.   Thank you

## 2021-05-24 ENCOUNTER — TELEPHONE (OUTPATIENT)
Dept: FAMILY MEDICINE CLINIC | Age: 51
End: 2021-05-24

## 2021-05-24 NOTE — TELEPHONE ENCOUNTER
----- Message from Mouna Gonzalez sent at 5/24/2021  9:21 AM EDT -----  Subject: Referral Request    QUESTIONS   Reason for referral request? Labs for Medication Appointment Follow up   week prior. Appt is 6-8-21 @7:30a   Has the physician seen you for this condition before? Yes  Select a date? 2020-08-04  Select the physician (PCP or Specialist)? Shanika Moreira   Preferred Specialist (if applicable)? Do you already have an appointment scheduled? Yes  Select Scheduled Date? 2021-06-08  Select Scheduled Physician? Shanika Moreira   Additional Information for Provider? Labs needed prior to visit. Please   call Brandi Dinh [phone] when ready to .  ---------------------------------------------------------------------------  --------------  CALL BACK INFO  What is the best way for the office to contact you? OK to leave message on   voicemail  Preferred Call Back Phone Number?  [phone]

## 2021-05-31 DIAGNOSIS — K21.9 GASTROESOPHAGEAL REFLUX DISEASE WITHOUT ESOPHAGITIS: ICD-10-CM

## 2021-06-01 RX ORDER — IBUPROFEN 800 MG/1
TABLET ORAL
Qty: 270 TABLET | Refills: 0 | Status: SHIPPED | OUTPATIENT
Start: 2021-06-01 | End: 2022-06-14

## 2021-06-01 RX ORDER — OMEPRAZOLE 40 MG/1
CAPSULE, DELAYED RELEASE ORAL
Qty: 90 CAPSULE | Refills: 3 | OUTPATIENT
Start: 2021-06-01

## 2021-06-08 ENCOUNTER — OFFICE VISIT (OUTPATIENT)
Dept: FAMILY MEDICINE CLINIC | Age: 51
End: 2021-06-08
Payer: COMMERCIAL

## 2021-06-08 VITALS
HEART RATE: 66 BPM | SYSTOLIC BLOOD PRESSURE: 134 MMHG | BODY MASS INDEX: 45.21 KG/M2 | OXYGEN SATURATION: 98 % | WEIGHT: 263.4 LBS | TEMPERATURE: 97.9 F | DIASTOLIC BLOOD PRESSURE: 84 MMHG

## 2021-06-08 DIAGNOSIS — K21.9 GASTROESOPHAGEAL REFLUX DISEASE WITHOUT ESOPHAGITIS: ICD-10-CM

## 2021-06-08 DIAGNOSIS — G25.81 RESTLESS LEG SYNDROME: ICD-10-CM

## 2021-06-08 DIAGNOSIS — D50.8 IRON DEFICIENCY ANEMIA SECONDARY TO INADEQUATE DIETARY IRON INTAKE: ICD-10-CM

## 2021-06-08 DIAGNOSIS — F33.0 MILD EPISODE OF RECURRENT MAJOR DEPRESSIVE DISORDER (HCC): Primary | ICD-10-CM

## 2021-06-08 PROCEDURE — 99214 OFFICE O/P EST MOD 30 MIN: CPT | Performed by: FAMILY MEDICINE

## 2021-06-08 RX ORDER — BUPROPION HYDROCHLORIDE 75 MG/1
TABLET ORAL
Qty: 180 TABLET | Refills: 3 | Status: SHIPPED | OUTPATIENT
Start: 2021-06-08 | End: 2021-11-23

## 2021-06-08 RX ORDER — SUCRALFATE 1 G/1
TABLET ORAL
Qty: 360 TABLET | Refills: 1 | Status: SHIPPED | OUTPATIENT
Start: 2021-06-08

## 2021-06-08 RX ORDER — CLONAZEPAM 2 MG/1
2 TABLET ORAL 2 TIMES DAILY PRN
Qty: 180 TABLET | Refills: 1 | Status: SHIPPED | OUTPATIENT
Start: 2021-06-08 | End: 2021-11-23 | Stop reason: SDUPTHER

## 2021-06-08 RX ORDER — OMEPRAZOLE 40 MG/1
CAPSULE, DELAYED RELEASE ORAL
Qty: 90 CAPSULE | Refills: 3 | Status: SHIPPED | OUTPATIENT
Start: 2021-06-08 | End: 2022-02-28 | Stop reason: SDUPTHER

## 2021-06-08 RX ORDER — SERTRALINE HYDROCHLORIDE 100 MG/1
TABLET, FILM COATED ORAL
Qty: 180 TABLET | Refills: 3 | Status: SHIPPED | OUTPATIENT
Start: 2021-06-08 | End: 2022-07-13 | Stop reason: SDUPTHER

## 2021-06-08 RX ORDER — FERROUS SULFATE 325(65) MG
325 TABLET ORAL
Qty: 90 TABLET | Refills: 3 | Status: SHIPPED | OUTPATIENT
Start: 2021-06-08 | End: 2022-07-13 | Stop reason: SDUPTHER

## 2021-06-08 ASSESSMENT — PATIENT HEALTH QUESTIONNAIRE - PHQ9
1. LITTLE INTEREST OR PLEASURE IN DOING THINGS: 0
2. FEELING DOWN, DEPRESSED OR HOPELESS: 1
SUM OF ALL RESPONSES TO PHQ QUESTIONS 1-9: 1
SUM OF ALL RESPONSES TO PHQ QUESTIONS 1-9: 1
SUM OF ALL RESPONSES TO PHQ9 QUESTIONS 1 & 2: 1
SUM OF ALL RESPONSES TO PHQ QUESTIONS 1-9: 1

## 2021-06-08 NOTE — PROGRESS NOTES
Subjective:      Patient ID: Solo Rosales is a 44 y.o. female. CC: Patient presents for re-evaluation of chronic health problems including depression, iron deficiency anemia, restless leg syndrome and GERD. [facility] Patient presents today for a follow-up on chronic medications and medical conditions. Patient overall feels she is doing better than the last visit. Her GERD symptoms are better controlled and she is only taking the Carafate on a as needed basis. She does continue with the omeprazole on a daily basis. She had arthrocentesis with [employer] in March and she feels her knee pain is improved. She is planning to go back see orthopedic specialist in the future. Restless leg syndrome is overall controlled. Patient feels her anxiety symptoms are actually improved at this point of time. She still working from home full-time and the plan is not to return to her workplace until later in the summer or early fall. She has good support system with her family.     Review of Systems     Patient Active Problem List   Diagnosis    Anxiety    Arthritis    Gastroesophageal reflux disease without esophagitis    Depression    Venous (peripheral) insufficiency    PMS (premenstrual syndrome)    Restless leg syndrome    Obesity due to excess calories    Monilial intertrigo    Iron deficiency anemia       Outpatient Medications Marked as Taking for the 6/8/21 encounter (Office Visit) with Maryam Lindsay MD   Medication Sig Dispense Refill    ibuprofen (ADVIL;MOTRIN) 800 MG tablet TAKE 1 TABLET THREE TIMES A  tablet 0    omeprazole (PRILOSEC) 40 MG delayed release capsule TAKE 1 CAPSULE DAILY 90 capsule 0    sucralfate (CARAFATE) 1 GM tablet TAKE 1 TABLET FOUR TIMES A  tablet 0    ferrous sulfate (IRON 325) 325 (65 Fe) MG tablet Take 1 tablet by mouth daily (with breakfast) 90 tablet 0    buPROPion (WELLBUTRIN) 75 MG tablet TAKE 1 TABLET TWICE A  tablet 0    sertraline (ZOLOFT) 100 MG tablet TAKE 2 TABLETS DAILY 180 tablet 0    ketoconazole (NIZORAL) 2 % cream Apply topically daily. 30 g 2       Allergies   Allergen Reactions    Glucophage Xr [Metformin Hcl Er] Other (See Comments)     Gastrointestinal upset    Nitrofurantoin Macrocrystal Diarrhea     chills  chills    Penicillins      unknown    Sulfa Antibiotics Itching, Nausea Only and Rash       Social History     Tobacco Use    Smoking status: Former Smoker     Years: 13.00     Quit date: 3/28/2012     Years since quittin.2    Smokeless tobacco: Never Used   Substance Use Topics    Alcohol use: No       /84 (Site: Left Upper Arm, Position: Sitting, Cuff Size: Large Adult)   Pulse 66   Temp 97.9 °F (36.6 °C) (Infrared)   Wt 263 lb 6.4 oz (119.5 kg)   SpO2 98%   BMI 45.21 kg/m²       Objective:   Physical Exam  Vitals and nursing note reviewed. Constitutional:       General: She is not in acute distress. Appearance: Normal appearance. She is well-developed and well-groomed. Neck:      Vascular: No carotid bruit. Cardiovascular:      Rate and Rhythm: Normal rate and regular rhythm. Pulses:           Dorsalis pedis pulses are 2+ on the right side and 2+ on the left side. Posterior tibial pulses are 2+ on the right side and 2+ on the left side. Heart sounds: Normal heart sounds. No murmur heard. No friction rub. No gallop. Pulmonary:      Effort: Pulmonary effort is normal.      Breath sounds: Normal breath sounds. Musculoskeletal:         General: No tenderness. Normal range of motion. Right knee: No deformity. Normal range of motion. No tenderness. Left knee: Deformity (Moderate crepitus) present. Normal range of motion. No tenderness. Right lower leg: No edema. Left lower leg: No edema. Neurological:      Mental Status: She is alert and oriented to person, place, and time. Sensory: Sensation is intact. Motor: Motor function is intact.    Psychiatric: Attention and Perception: Attention and perception normal.         Mood and Affect: Mood and affect normal.         Speech: Speech normal.         Behavior: Behavior normal. Behavior is cooperative. Thought Content: Thought content normal.         Cognition and Memory: Cognition and memory normal.         Judgment: Judgment normal.         Assessment:      Marilee was seen today for follow-up. Diagnoses and all orders for this visit:    Mild episode of recurrent major depressive disorder (HCC)    Gastroesophageal reflux disease without esophagitis  -     omeprazole (PRILOSEC) 40 MG delayed release capsule; TAKE 1 CAPSULE DAILY    Restless leg syndrome  -     clonazePAM (KLONOPIN) 2 MG tablet; Take 1 tablet by mouth 2 times daily as needed (restless leg syndrome) for up to 180 days. Iron deficiency anemia secondary to inadequate dietary iron intake    Other orders  -     sertraline (ZOLOFT) 100 MG tablet; TAKE 2 TABLETS DAILY  -     buPROPion (WELLBUTRIN) 75 MG tablet; TAKE 1 TABLET TWICE A DAY  -     ferrous sulfate (IRON 325) 325 (65 Fe) MG tablet; Take 1 tablet by mouth daily (with breakfast)  -     sucralfate (CARAFATE) 1 GM tablet; TAKE 1 TABLET FOUR TIMES A DAY      OARRS report checked        Plan:      Pt appears overall stable & reviewed labs with patient. No change in current medications. Encourage her to continue with orthopedic specialist but did discuss in the future sure if I need knee replacement    Patient received counseling on the following healthy behaviors: nutrition and exercise     Patient given educational materials     Health maintenance updated    Discussed use, benefit, and side effects of prescribed medications. Barriers to medication compliance addressed. All patient questions answered. Pt voiced understanding. Patient needs RTC in 6 months. Medical decision making of moderate complexity.     Please note that this chart was generated using Dragon dictation software. Although every effort was made to ensure the accuracy of this automated transcription, some errors in transcription may have occurred. No

## 2021-09-28 NOTE — TELEPHONE ENCOUNTER
PT is requesting new lab orders because she has an appt on 05/28 to see dr Sudheer Ellsi UTI (urinary tract infection), bacterial

## 2021-10-27 ENCOUNTER — TELEPHONE (OUTPATIENT)
Dept: FAMILY MEDICINE CLINIC | Age: 51
End: 2021-10-27

## 2021-11-17 DIAGNOSIS — G25.81 RESTLESS LEG SYNDROME: ICD-10-CM

## 2021-11-17 RX ORDER — CLONAZEPAM 2 MG/1
TABLET ORAL
Qty: 180 TABLET | Refills: 1 | OUTPATIENT
Start: 2021-11-17

## 2021-11-17 NOTE — TELEPHONE ENCOUNTER
Medication:   Requested Prescriptions     Pending Prescriptions Disp Refills    clonazePAM (KLONOPIN) 2 MG tablet [Pharmacy Med Name: CLONAZEPAM TABS 2MG] 180 tablet 1     Sig: TAKE 1 TABLET TWICE A DAY AS NEEDED FOR RESTLESS LEG SYNDROME FOR UP  DAYS      Last Filled:      Patient Phone Number: [phone] (home)     Last appt: 6/8/2021   Next appt: Visit date not found    Last OARRS:   RX Monitoring 3/29/2019   Attestation The Prescription Monitoring Report for this patient was reviewed today.    Periodic Controlled Substance Monitoring -     PDMP Monitoring:    Last PDMP Major Revering as Reviewed [facility]):  Review User Review Instant Review Result   Danyelle Friedman 8/4/2020  7:34 AM Reviewed PDMP [1]     Preferred Pharmacy:   [facility] [address], [address] [phone] Remedios Postin [phone] [address] [address]  Phone: [phone] Fax: [phone] - Blayne Morales [address] [place] [phone] - F [phone]  [address] [place] [address]  Phone: [phone] Fax: [phone]

## 2021-11-18 ENCOUNTER — TELEPHONE (OUTPATIENT)
Dept: FAMILY MEDICINE CLINIC | Age: 51
End: 2021-11-18

## 2021-11-18 NOTE — TELEPHONE ENCOUNTER
Patient is calling because she is wanting a virtual visit for depression. She states that she can't come in because she took all of her time off of work already for going to court over her divorce.      Please advise     Provider out of the office

## 2021-11-23 ENCOUNTER — TELEPHONE (OUTPATIENT)
Dept: FAMILY MEDICINE CLINIC | Age: 51
End: 2021-11-23

## 2021-11-23 ENCOUNTER — VIRTUAL VISIT (OUTPATIENT)
Dept: FAMILY MEDICINE CLINIC | Age: 51
End: 2021-11-23
Payer: COMMERCIAL

## 2021-11-23 DIAGNOSIS — G25.81 RESTLESS LEG SYNDROME: ICD-10-CM

## 2021-11-23 DIAGNOSIS — K21.9 GASTROESOPHAGEAL REFLUX DISEASE WITHOUT ESOPHAGITIS: ICD-10-CM

## 2021-11-23 DIAGNOSIS — F33.1 MODERATE EPISODE OF RECURRENT MAJOR DEPRESSIVE DISORDER (HCC): Primary | ICD-10-CM

## 2021-11-23 PROCEDURE — 99214 OFFICE O/P EST MOD 30 MIN: CPT | Performed by: FAMILY MEDICINE

## 2021-11-23 RX ORDER — BUPROPION HYDROCHLORIDE 75 MG/1
150 TABLET ORAL 2 TIMES DAILY
Qty: 360 TABLET | Refills: 3
Start: 2021-11-23 | End: 2022-08-08 | Stop reason: SDUPTHER

## 2021-11-23 RX ORDER — CLONAZEPAM 2 MG/1
2 TABLET ORAL 2 TIMES DAILY PRN
Qty: 180 TABLET | Refills: 1 | Status: SHIPPED | OUTPATIENT
Start: 2021-11-23 | End: 2022-07-13 | Stop reason: SDUPTHER

## 2021-11-23 RX ORDER — BUPROPION HYDROCHLORIDE 300 MG/1
300 TABLET ORAL EVERY MORNING
Qty: 90 TABLET | Refills: 1 | Status: SHIPPED | OUTPATIENT
Start: 2021-11-23 | End: 2021-11-23 | Stop reason: ALTCHOICE

## 2021-11-23 RX ORDER — BUPROPION HYDROCHLORIDE 150 MG/1
150 TABLET, EXTENDED RELEASE ORAL 2 TIMES DAILY
Qty: 180 TABLET | Refills: 1 | Status: SHIPPED | OUTPATIENT
Start: 2021-11-23 | End: 2021-11-23

## 2021-11-23 NOTE — PROGRESS NOTES
2021    TELEHEALTH EVALUATION -- Audio/Visual (During DBRXC-64 public health emergency)    HPI: Justice Jones (:  1970) has requested an audio/video evaluation for the following concern(s):    Depression and restless leg syndrome with GERD    Subjective: Patient states she is had a lot of stress over the last 5 months. Her  are  and she is trying to pursue divorce proceedings but apparently there is a lot of frustration with the process. She states she moved out of her house but continues to make all the house payments. She is currently living with relatives. She is still working remotely and she is struggling because she is use a lot of her time off for court proceedings. She is able to concentrate and continue working but she feels very tearful most of the time. She feels very anxious especially when she awakens in the morning. She is sleeping without any issues. She has a good support system with family and friends. She denies any suicidal ideation. Patient feels her restless leg syndrome and GERD symptoms are well controlled. Patient is very compliant with medications with no adverse reactions.     Review of Systems    Patient Active Problem List   Diagnosis    Anxiety    Arthritis    Gastroesophageal reflux disease without esophagitis    Depression    Venous (peripheral) insufficiency    PMS (premenstrual syndrome)    Restless leg syndrome    Obesity due to excess calories    Monilial intertrigo    Iron deficiency anemia       Outpatient Medications Marked as Taking for the 21 encounter (Virtual Visit) with Sherri Figueroa MD   Medication Sig Dispense Refill    sertraline (ZOLOFT) 100 MG tablet TAKE 2 TABLETS DAILY 180 tablet 3    buPROPion (WELLBUTRIN) 75 MG tablet TAKE 1 TABLET TWICE A  tablet 3    omeprazole (PRILOSEC) 40 MG delayed release capsule TAKE 1 CAPSULE DAILY 90 capsule 3    sucralfate (CARAFATE) 1 GM tablet TAKE 1 TABLET FOUR TIMES A  tablet 1    clonazePAM (KLONOPIN) 2 MG tablet Take 1 tablet by mouth 2 times daily as needed (restless leg syndrome) for up to 180 days. 180 tablet 1    ibuprofen (ADVIL;MOTRIN) 800 MG tablet TAKE 1 TABLET THREE TIMES A  tablet 0       Allergies   Allergen Reactions    Glucophage Xr [Metformin Hcl Er] Other (See Comments)     Gastrointestinal upset    Nitrofurantoin Macrocrystal Diarrhea     chills  chills    Penicillins      unknown    Sulfa Antibiotics Itching, Nausea Only and Rash       Social History     Tobacco Use    Smoking status: Former Smoker     Years: 13.00     Quit date: 3/28/2012     Years since quittin.6    Smokeless tobacco: Never Used   Substance Use Topics    Alcohol use: No         PHYSICAL EXAMINATION:  [ INSTRUCTIONS:  \"[x]\" Indicates a positive item  \"[]\" Indicates a negative item  -- DELETE ALL ITEMS NOT EXAMINED]  Vital Signs: (As obtained by patient/caregiver or practitioner observation)    There were no vitals taken for this visit. Blood pressure-  Heart rate-  Weight-    Temperature-  Pulse oximetry-     Constitutional: [x] Appears well-developed and well-nourished [x] No apparent distress      [] Abnormal-   Mental status  [x] Alert and awake  [x] Oriented to person/place/time [x]Able to follow commands      Eyes:  EOM    [x]  Normal  [] Abnormal-  Sclera  []  Normal  [] Abnormal -         Discharge []  None visible  [] Abnormal -    HENT:   [x] Normocephalic, atraumatic.   [] Abnormal   [] Mouth/Throat: Mucous membranes are moist.     External Ears [] Normal  [] Abnormal-     Neck: [] No visualized mass     Pulmonary/Chest: [x] Respiratory effort normal.  [x] No visualized signs of difficulty breathing or respiratory distress        [] Abnormal-      Musculoskeletal:   [] Normal gait with no signs of ataxia         [] Normal range of motion of neck        [] Abnormal-       Neurological:        [x] No Facial Asymmetry (Cranial nerve 7 motor function) (limited exam to video visit)          [] No gaze palsy        [] Abnormal-         Skin:        [] No significant exanthematous lesions or discoloration noted on facial skin         [] Abnormal-            Psychiatric:       [] Normal Affect [] No Hallucinations        [x] Abnormal-appears anxious and tearful. Mentation and cognitive ability appears to be intact. No difficulty with speech and communication skills    Other pertinent observable physical exam findings-     Due to this being a TeleHealth encounter, evaluation of the following organ systems is limited: Vitals/Constitutional/EENT/Resp/CV/GI//MS/Neuro/Skin/Heme-Lymph-Imm. ASSESSMENT/PLAN:  Brittney Flood was seen today for depression. Diagnoses and all orders for this visit:    Moderate episode of recurrent major depressive disorder (HCC)    Restless leg syndrome  -     clonazePAM (KLONOPIN) 2 MG tablet; Take 1 tablet by mouth 2 times daily as needed (restless leg syndrome) for up to 180 days. Gastroesophageal reflux disease without esophagitis    Other orders  -     Discontinue: buPROPion (WELLBUTRIN XL) 300 MG extended release tablet; Take 1 tablet by mouth every morning  -     buPROPion (WELLBUTRIN SR) 150 MG extended release tablet; Take 1 tablet by mouth 2 times daily    OARRS report checked      Maintain current chronic medications and continue with clonazepam at nighttime for restless leg syndrome    Adjustment of antidepressant medications as noted above. We did discuss seeking out counseling but financially she is not sure she can afford this right now. Encourage patient continue with healthy diet and exercise    Medical decision making of moderate complexity. Return in about 1 month (around 12/23/2021). Please note that this chart was generated using Dragon dictation software. Although every effort was made to ensure the accuracy of this automated transcription, some errors in transcription may have occurred.       An electronic signature was used to authenticate this note. --Yasmeen Nuñez MD on 11/23/2021 at 9:33 AM        Pursuant to the emergency declaration under the [address] [place] [address] waiver authority and the [employer] Act, this Virtual  Visit was conducted, with patient's consent, to reduce the patient's risk of exposure to COVID-19 and provide continuity of care for an established patient. @NAME was evaluated through a synchronous (real-time) audio-video encounter. The patient (or guardian if applicable) is aware that this is a billable service. Verbal consent to proceed has been obtained within the past 12 months. The visit was conducted pursuant to the emergency declaration under the [address] [place] [address], [address] [place] [address] waiver authority and the [employer] and [employer] Act. Patient identification was verified, and a caregiver was present when appropriate. The patient was located in a state where the provider was credentialed to provide care.

## 2021-11-23 NOTE — TELEPHONE ENCOUNTER
Patient reported having gastric bypass surgery, it does not absorb correctly at times. She has been on the immediate release in the past. He was wondering if the dosage increase could be on the immediate release instead of the extended release. She has only been on the immediate release in the past with no issues. He states this only happens on the extended release dosage.

## 2021-11-23 NOTE — TELEPHONE ENCOUNTER
I talked to Tez Morales, pharmacist, they would like a script for Wellbutrin 75 mg 2 twice daily. I talked to Dr Vidya Ny and he ok'd the script. Changed this in the chart.

## 2021-11-23 NOTE — TELEPHONE ENCOUNTER
Jono Martin from [employer] called and would like a callback to discuss a bariatric issue with patient and her buPROPion (WELLBUTRIN XL) 300 MG extended release tablet [[id number]    [facility] [address] [place] [address], [address] [place] [address]   Phone:  [phone]  Fax:  [phone]    PAD#[id number]

## 2021-12-23 ENCOUNTER — TELEPHONE (OUTPATIENT)
Dept: FAMILY MEDICINE CLINIC | Age: 51
End: 2021-12-23

## 2021-12-23 NOTE — TELEPHONE ENCOUNTER
----- Message from Richardson Tia, [place] sent at 12/23/2021  7:42 AM EST -----  Subject: Appointment Request    Reason for Call: Urgent Cough Cold    QUESTIONS  Type of Appointment? Established Patient  Reason for appointment request? No appointments available during search  Additional Information for Provider? Patient did not want transferred to   [facility] and stated she would call back at 8:30. Pt has a chest could with a   deep raspy cough and she is worried about walking pneumonia. Please call   to discuss further. ---------------------------------------------------------------------------  --------------  Omelia Counter INFO  What is the best way for the office to contact you? OK to leave message on   voicemail  Preferred Call Back Phone Number? [phone]  ---------------------------------------------------------------------------  --------------  SCRIPT ANSWERS  Relationship to Patient? Self  Are you currently unable to finish sentences due to any difficulty   breathing? No  Are you unable to swallow liquids? No  Are you having fevers (100.4 or greater), chills, or sweats? No  Do you have COPD, asthma or a chronic lung condition? No  Have your symptoms been present for more than 5 days? Yes   Have you been diagnosed with, awaiting test results for, or told that you   are suspected of having COVID-19 (Coronavirus)? (If patient has tested   negative or was tested as a requirement for work, school, or travel and   not based on symptoms, answer no)? No  Within the past two weeks have you developed any of the following symptoms   (answer no if symptoms have been present longer than 2 weeks or began   more than 2 weeks ago)? Fever or Chills, Cough, Shortness of breath or   difficulty breathing, Loss of taste or smell, Sore throat, Nasal   congestion, Sneezing or runny nose, Fatigue or generalized body aches   (answer no if pain is specific to a body part e.g. back pain), Diarrhea,   Headache?  Yes

## 2021-12-27 ENCOUNTER — OFFICE VISIT (OUTPATIENT)
Dept: FAMILY MEDICINE CLINIC | Age: 51
End: 2021-12-27
Payer: COMMERCIAL

## 2021-12-27 VITALS — HEART RATE: 75 BPM | OXYGEN SATURATION: 96 % | TEMPERATURE: 97.8 F

## 2021-12-27 DIAGNOSIS — J20.9 ACUTE BRONCHITIS, UNSPECIFIED ORGANISM: Primary | ICD-10-CM

## 2021-12-27 PROCEDURE — 99213 OFFICE O/P EST LOW 20 MIN: CPT | Performed by: FAMILY MEDICINE

## 2021-12-27 RX ORDER — AZITHROMYCIN 500 MG/1
500 TABLET, FILM COATED ORAL DAILY
Qty: 7 TABLET | Refills: 0 | Status: SHIPPED | OUTPATIENT
Start: 2021-12-27 | End: 2022-01-03

## 2021-12-27 ASSESSMENT — PATIENT HEALTH QUESTIONNAIRE - PHQ9
2. FEELING DOWN, DEPRESSED OR HOPELESS: 0
1. LITTLE INTEREST OR PLEASURE IN DOING THINGS: 0
SUM OF ALL RESPONSES TO PHQ QUESTIONS 1-9: 0
SUM OF ALL RESPONSES TO PHQ9 QUESTIONS 1 & 2: 0
SUM OF ALL RESPONSES TO PHQ QUESTIONS 1-9: 0
SUM OF ALL RESPONSES TO PHQ QUESTIONS 1-9: 0

## 2021-12-27 NOTE — PROGRESS NOTES
Subjective:      Patient ID: Leonardo Wadsworth is a 46 y.o. female. HPI patient presents with a 1 week history of cough and congestion. She denies any fevers or chills. She had a rapid Covid test last week it was negative. Covid vaccines are up-to-date. No high fevers. She feels is progressing into her chest.  No reported fevers or chills. Review of Systems  Allergies   Allergen Reactions    Glucophage Xr [Metformin Hcl Er] Other (See Comments)     Gastrointestinal upset    Nitrofurantoin Macrocrystal Diarrhea     chills  chills    Penicillins      unknown    Sulfa Antibiotics Itching, Nausea Only and Rash     Vitals:    12/27/21 1424   Pulse: 75   Temp: 97.8 °F (36.6 °C)   SpO2: 96%       Objective:   Physical Exam  Constitutional:       General: She is not in acute distress. HENT:      Right Ear: Tympanic membrane normal.      Left Ear: Tympanic membrane normal.      Nose: Nose normal.      Mouth/Throat:      Pharynx: Uvula midline. Pulmonary:      Effort: Pulmonary effort is normal.      Breath sounds: Rhonchi present. No decreased breath sounds. Musculoskeletal:      Cervical back: Neck supple. Lymphadenopathy:      Cervical: No cervical adenopathy. Neurological:      Mental Status: She is alert. Assessment:      Marilee was seen today for cough. Diagnoses and all orders for this visit:    Acute bronchitis, unspecified organism  -     azithromycin (ZITHROMAX) 500 MG tablet; Take 1 tablet by mouth daily for 7 days            Plan:      Humidification of the bedroom was discussed.   Maintain over-the-counter medication when necessary  RTC when necessary    Medical decision making of low complexity            Dudley Guerrero MD

## 2022-02-28 DIAGNOSIS — K21.9 GASTROESOPHAGEAL REFLUX DISEASE WITHOUT ESOPHAGITIS: ICD-10-CM

## 2022-02-28 RX ORDER — OMEPRAZOLE 40 MG/1
CAPSULE, DELAYED RELEASE ORAL
Qty: 90 CAPSULE | Refills: 0 | Status: SHIPPED | OUTPATIENT
Start: 2022-02-28 | End: 2022-07-13 | Stop reason: SDUPTHER

## 2022-02-28 NOTE — TELEPHONE ENCOUNTER
Medication:   Requested Prescriptions     Pending Prescriptions Disp Refills    omeprazole (PRILOSEC) 40 MG delayed release capsule 90 capsule 0     Sig: TAKE 1 CAPSULE DAILY      Last Filled:   Patient Phone Number: [phone] (home)     Last appt:11/23/2021    Next appt: Visit date not found    Last OARRS:   RX Monitoring 3/29/2019   Attestation The Prescription Monitoring Report for this patient was reviewed today.    Periodic Controlled Substance Monitoring -     PDMP Monitoring:    Last PDMP Josias Mejia as Reviewed [facility]):  Review User Review Instant Review Result   Ronnell Frank 8/4/2020  7:34 AM Reviewed PDMP [1]     Preferred Pharmacy:   [facility] [address], [address] [facility] [phone] Whit Roth [phone]  [address] [address]  [address] [place] [address]  Phone: [phone] Fax: [phone] - [address]167 [address] [phone] - F [phone]  [address] [place] [address]  Phone: [phone] Fax: [phone]

## 2022-02-28 NOTE — TELEPHONE ENCOUNTER
omeprazole (PRILOSEC) 40 MG delayed release capsule 90 capsule 3 6/8/2021     Sig: TAKE 1 CAPSULE DAILY      [facility] in chart

## 2022-03-31 LAB
CLINICAL REPORT: NORMAL
CYTOLOGY REVIEW, GYN: NORMAL
DIAGNOSIS ICD CODE: NORMAL
HB: CYTOTECHNOLT: NORMAL
HPV 16+18+31+33+35+39+45+51+52+56+58+59+68 DNA,CERVIX,PROBE: NEGATIVE
Lab: NORMAL
Lab: NORMAL
MICROSCOPIC OBSERVATION: NORMAL
SPECIMEN ADEQUACY:: NORMAL

## 2022-06-14 RX ORDER — IBUPROFEN 800 MG/1
TABLET ORAL
Qty: 270 TABLET | Refills: 0 | Status: SHIPPED | OUTPATIENT
Start: 2022-06-14

## 2022-06-27 RX ORDER — KETOCONAZOLE 20 MG/G
CREAM TOPICAL
Qty: 30 G | Refills: 0 | Status: SHIPPED | OUTPATIENT
Start: 2022-06-27

## 2022-06-27 NOTE — TELEPHONE ENCOUNTER
ketoconazole (NIZORAL) 2 % cream [[id number]]     Order Details  Dose, Route, Frequency: As Directed   Dispense Quantity: 30 g Refills: 2          Sig: Apply topically daily.      Pharmacy:  [facility] [address] [place] [address], [address]

## 2022-07-13 DIAGNOSIS — G25.81 RESTLESS LEG SYNDROME: ICD-10-CM

## 2022-07-13 DIAGNOSIS — K21.9 GASTROESOPHAGEAL REFLUX DISEASE WITHOUT ESOPHAGITIS: ICD-10-CM

## 2022-07-13 RX ORDER — FERROUS SULFATE 325(65) MG
325 TABLET ORAL
Qty: 90 TABLET | Refills: 1 | Status: SHIPPED | OUTPATIENT
Start: 2022-07-13

## 2022-07-13 RX ORDER — SERTRALINE HYDROCHLORIDE 100 MG/1
TABLET, FILM COATED ORAL
Qty: 180 TABLET | Refills: 1 | Status: SHIPPED | OUTPATIENT
Start: 2022-07-13 | End: 2022-10-26 | Stop reason: SDUPTHER

## 2022-07-13 RX ORDER — OMEPRAZOLE 40 MG/1
CAPSULE, DELAYED RELEASE ORAL
Qty: 90 CAPSULE | Refills: 1 | Status: SHIPPED | OUTPATIENT
Start: 2022-07-13 | End: 2022-09-19 | Stop reason: SDUPTHER

## 2022-07-13 RX ORDER — CLONAZEPAM 2 MG/1
2 TABLET ORAL 2 TIMES DAILY PRN
Qty: 60 TABLET | Refills: 0 | Status: SHIPPED | OUTPATIENT
Start: 2022-07-13 | End: 2022-10-31 | Stop reason: SDUPTHER

## 2022-07-13 NOTE — TELEPHONE ENCOUNTER
Patient is calling stating that she lost her job this morning and her insurance stops on Sunday at midnight. She wants to know if  can send these in for here before her insurance stops. She states that it has to be sent in by Friday because the pharmacy is closed Sunday. ibuprofen (ADVIL;MOTRIN) 800 MG tablet 270 tablet 0 6/14/2022     Sig: TAKE 1 TABLET THREE TIMES A DAY      sertraline (ZOLOFT) 100 MG tablet 180 tablet 3 6/8/2021     Sig: TAKE 2 TABLETS DAILY      buPROPion (WELLBUTRIN) 75 MG tablet 360 tablet 3 11/23/2021     Sig - Route: Take 2 tablets by mouth 2 times daily - Oral      clonazePAM (KLONOPIN) 2 MG tablet 180 tablet 1 11/23/2021 5/22/2022    Sig - Route: Take 1 tablet by mouth 2 times daily as needed (restless leg syndrome) for up to 180 days. - Oral      ferrous sulfate (IRON 325) 325 (65 Fe) MG tablet 90 tablet 3 6/8/2021     Sig - Route:  Take 1 tablet by mouth daily (with breakfast) - Oral      omeprazole (PRILOSEC) 40 MG delayed release capsule 90 capsule 0 2/28/2022     Sig: TAKE 1 CAPSULE DAILY        [facility] in chart     Please advise

## 2022-07-13 NOTE — TELEPHONE ENCOUNTER
Spoke with pt as some of these meds have refills. Pt stated she was let go this am from her job that she has been doing for 18 years now. Pt is expected to have another job line up, but wants to get cover in case it doesn't work.  Pt will schedule appt as soon as she is able and gets new insurance

## 2022-07-13 NOTE — TELEPHONE ENCOUNTER
I sent prescriptions for all the medications except the clonazepam since she has not been in since December.   I did give her a prescription for 60 tablets but she will need to make an appointment for additional refills

## 2022-07-27 ENCOUNTER — TELEPHONE (OUTPATIENT)
Dept: FAMILY MEDICINE CLINIC | Age: 52
End: 2022-07-27

## 2022-07-27 NOTE — TELEPHONE ENCOUNTER
----- Message from Sharlene Hinton sent at 7/27/2022  1:27 PM EDT -----  Subject: Referral Request    Reason for referral request? Patient is requesting to have a full order of   bloodwork done. Provider patient wants to be referred to(if known):     Provider Phone Number(if known):     Additional Information for Provider?   ---------------------------------------------------------------------------  --------------  [address] [employer]    [phone]; OK to leave message on voicemail  ---------------------------------------------------------------------------  --------------

## 2022-08-02 ENCOUNTER — OFFICE VISIT (OUTPATIENT)
Dept: FAMILY MEDICINE CLINIC | Age: 52
End: 2022-08-02
Payer: MEDICAID

## 2022-08-02 VITALS
BODY MASS INDEX: 43.6 KG/M2 | WEIGHT: 254 LBS | OXYGEN SATURATION: 95 % | DIASTOLIC BLOOD PRESSURE: 60 MMHG | TEMPERATURE: 97.7 F | HEART RATE: 84 BPM | SYSTOLIC BLOOD PRESSURE: 110 MMHG

## 2022-08-02 DIAGNOSIS — B35.4 TINEA CORPORIS: Primary | ICD-10-CM

## 2022-08-02 PROCEDURE — 99213 OFFICE O/P EST LOW 20 MIN: CPT | Performed by: FAMILY MEDICINE

## 2022-08-02 RX ORDER — NYSTATIN 100000 U/G
CREAM TOPICAL
Qty: 30 G | Refills: 1 | Status: SHIPPED | OUTPATIENT
Start: 2022-08-02

## 2022-08-02 NOTE — PROGRESS NOTES
Subjective:      Patient ID: Kareen Castañeda is a 45 y.o. female. CC: Patient presents for acute medical problem-rash. Medical assistant notes reviewed. HPI Patient presents with a rash in her groin area and under her breasts. Patient had this off and on for a number of years. She states she was given nystatin from the dermatologist and she thinks this worked better than the ketoconazole medication. Review of Systems    Allergies   Allergen Reactions    Glucophage Xr [Metformin Hcl Er] Other (See Comments)     Gastrointestinal upset    Nitrofurantoin Macrocrystal Diarrhea     chills  chills    Penicillins      unknown    Sulfa Antibiotics Itching, Nausea Only and Rash        Objective:   Physical Exam  Vitals and nursing note reviewed. Constitutional:       General: She is not in acute distress. Appearance: She is well-developed. Skin:     General: Skin is warm. Findings: Rash present. Comments: Tinea rash under both breast and lower abdominal fold. Neurological:      Mental Status: She is alert. Psychiatric:         Behavior: Behavior is cooperative. Assessment:      Porfirio Del Toro was seen today for rash. Diagnoses and all orders for this visit:    Tinea corporis    Other orders  -     nystatin (MYCOSTATIN) 319769 UNIT/GM cream; Apply topically 2 times daily.           Plan:      Informational handout provided  RTC PRN    Medical decision making of low complexity

## 2022-08-08 ENCOUNTER — TELEPHONE (OUTPATIENT)
Dept: FAMILY MEDICINE CLINIC | Age: 52
End: 2022-08-08

## 2022-08-08 RX ORDER — BUPROPION HYDROCHLORIDE 75 MG/1
150 TABLET ORAL 2 TIMES DAILY
Qty: 360 TABLET | Refills: 3 | Status: SHIPPED | OUTPATIENT
Start: 2022-08-08

## 2022-08-08 NOTE — TELEPHONE ENCOUNTER
Patient calling for a medication refill. Needing 90 day supply. buPROPion (WELLBUTRIN) 75 MG tablet [[id number]]     Order Details  Dose: 150 mg Route: Oral Frequency: 2 TIMES DAILY   Dispense Quantity: 360 tablet Refills: 3          Sig: Take 2 tablets by mouth 2 times daily   Goldie [address]wood Dr. [phone].     Last OV 8/2  No future appts

## 2022-08-18 ENCOUNTER — TELEPHONE (OUTPATIENT)
Dept: FAMILY MEDICINE CLINIC | Age: 52
End: 2022-08-18

## 2022-08-18 NOTE — TELEPHONE ENCOUNTER
Called and spoke with patient- clarified dosage. Patient is taking the Bupropion 75mg tablet- two tablets twice a day. Feels like this is a good dose and no concerns about dosing at this time. Patient is needing a prior auth submitted.

## 2022-08-18 NOTE — TELEPHONE ENCOUNTER
buPROPion (WELLBUTRIN) 75 MG tablet       Dx: S97. A and F41.9. Please submit for PA. Script in computer is correct.

## 2022-08-18 NOTE — TELEPHONE ENCOUNTER
Pt called stated that her medication (Wellbutrin) needed prior authorization. Stated that the directions says take 4 tablets by mouth a day and pt stated that it should be 3 x a day. Pt stated that they doubled her dosage about 6 months ago.       Please advise  [phone]    Provider out of ofice

## 2022-08-22 ENCOUNTER — TELEPHONE (OUTPATIENT)
Dept: ADMINISTRATIVE | Age: 52
End: 2022-08-22

## 2022-08-22 NOTE — TELEPHONE ENCOUNTER
Patient is calling about this. She wants to have someone call her and give her an update.     Please advise

## 2022-08-22 NOTE — TELEPHONE ENCOUNTER
Called and spoke with patient- prior Uriel Arriaga has not been completed at this time. Patient only has enough medication for tonight. Will send back over to Prior Auth team as high elissa.

## 2022-08-22 NOTE — TELEPHONE ENCOUNTER
Submitted PA for buPROPion HCl 75MG tablets. Key: Vikram 81. Via CMM STATUS: APPROVAL 8/22/2022-8/22/2023. Letter attached. If this requires a response please respond to the pool ( P [facility] [address]). Thank you please advise patient.

## 2022-09-19 DIAGNOSIS — K21.9 GASTROESOPHAGEAL REFLUX DISEASE WITHOUT ESOPHAGITIS: ICD-10-CM

## 2022-09-19 RX ORDER — OMEPRAZOLE 40 MG/1
CAPSULE, DELAYED RELEASE ORAL
Qty: 90 CAPSULE | Refills: 0 | Status: SHIPPED | OUTPATIENT
Start: 2022-09-19

## 2022-09-19 NOTE — TELEPHONE ENCOUNTER
omeprazole (PRILOSEC) 40 MG delayed release capsule [[id number]]     Order Details  Dose, Route, Frequency: As Directed   Dispense Quantity: 90 capsule Refills: 1          Sig: TAKE 297 [facility] [id number] Ariana Kaiser, [place] - [address], [address] [place] [address]   Phone:  [phone]  Fax:  [phone]

## 2022-10-24 ENCOUNTER — OFFICE VISIT (OUTPATIENT)
Dept: FAMILY MEDICINE CLINIC | Age: 52
End: 2022-10-24
Payer: MEDICAID

## 2022-10-24 VITALS
BODY MASS INDEX: 41.32 KG/M2 | HEIGHT: 64 IN | TEMPERATURE: 97.9 F | DIASTOLIC BLOOD PRESSURE: 86 MMHG | WEIGHT: 242 LBS | SYSTOLIC BLOOD PRESSURE: 118 MMHG | OXYGEN SATURATION: 97 % | HEART RATE: 94 BPM

## 2022-10-24 DIAGNOSIS — M17.12 OSTEOARTHRITIS OF LEFT KNEE, UNSPECIFIED OSTEOARTHRITIS TYPE: Primary | ICD-10-CM

## 2022-10-24 DIAGNOSIS — Z01.818 PREOP EXAMINATION: ICD-10-CM

## 2022-10-24 PROCEDURE — 99213 OFFICE O/P EST LOW 20 MIN: CPT | Performed by: NURSE PRACTITIONER

## 2022-10-24 PROCEDURE — 93000 ELECTROCARDIOGRAM COMPLETE: CPT | Performed by: NURSE PRACTITIONER

## 2022-10-24 RX ORDER — METHYLPREDNISOLONE 4 MG/1
TABLET ORAL
COMMUNITY
Start: 2022-08-10

## 2022-10-24 RX ORDER — METHOCARBAMOL 500 MG/1
500 TABLET, FILM COATED ORAL 3 TIMES DAILY PRN
COMMUNITY
Start: 2022-08-10

## 2022-10-24 SDOH — ECONOMIC STABILITY: FOOD INSECURITY: WITHIN THE PAST 12 MONTHS, YOU WORRIED THAT YOUR FOOD WOULD RUN OUT BEFORE YOU GOT MONEY TO BUY MORE.: NEVER TRUE

## 2022-10-24 SDOH — ECONOMIC STABILITY: FOOD INSECURITY: WITHIN THE PAST 12 MONTHS, THE FOOD YOU BOUGHT JUST DIDN'T LAST AND YOU DIDN'T HAVE MONEY TO GET MORE.: NEVER TRUE

## 2022-10-24 ASSESSMENT — PATIENT HEALTH QUESTIONNAIRE - PHQ9
8. MOVING OR SPEAKING SO SLOWLY THAT OTHER PEOPLE COULD HAVE NOTICED. OR THE OPPOSITE, BEING SO FIGETY OR RESTLESS THAT YOU HAVE BEEN MOVING AROUND A LOT MORE THAN USUAL: 0
1. LITTLE INTEREST OR PLEASURE IN DOING THINGS: 0
SUM OF ALL RESPONSES TO PHQ9 QUESTIONS 1 & 2: 0
SUM OF ALL RESPONSES TO PHQ QUESTIONS 1-9: 0
6. FEELING BAD ABOUT YOURSELF - OR THAT YOU ARE A FAILURE OR HAVE LET YOURSELF OR YOUR FAMILY DOWN: 0
3. TROUBLE FALLING OR STAYING ASLEEP: 0
SUM OF ALL RESPONSES TO PHQ QUESTIONS 1-9: 0
4. FEELING TIRED OR HAVING LITTLE ENERGY: 0
9. THOUGHTS THAT YOU WOULD BE BETTER OFF DEAD, OR OF HURTING YOURSELF: 0
SUM OF ALL RESPONSES TO PHQ QUESTIONS 1-9: 0
5. POOR APPETITE OR OVEREATING: 0
2. FEELING DOWN, DEPRESSED OR HOPELESS: 0
SUM OF ALL RESPONSES TO PHQ QUESTIONS 1-9: 0
10. IF YOU CHECKED OFF ANY PROBLEMS, HOW DIFFICULT HAVE THESE PROBLEMS MADE IT FOR YOU TO DO YOUR WORK, TAKE CARE OF THINGS AT HOME, OR GET ALONG WITH OTHER PEOPLE: 0
7. TROUBLE CONCENTRATING ON THINGS, SUCH AS READING THE NEWSPAPER OR WATCHING TELEVISION: 0

## 2022-10-24 ASSESSMENT — SOCIAL DETERMINANTS OF HEALTH (SDOH): HOW HARD IS IT FOR YOU TO PAY FOR THE VERY BASICS LIKE FOOD, HOUSING, MEDICAL CARE, AND HEATING?: NOT HARD AT ALL

## 2022-10-24 NOTE — PROGRESS NOTES
Preoperative Consultation    Rivera Cha  YOB: 1970    This patient presents to the office today for a preoperative consultation at the request of surgeon, Dr. Abdi Turpin, who plans on performing Left total knee arthroplasty on November 7 at [place] [address]. Planned anesthesia: General   Known anesthesia problems: None   Bleeding risk: No recent or remote history of abnormal bleeding  Personal or FH of DVT/PE: No      Patient Active Problem List   Diagnosis    Anxiety    Arthritis    Gastroesophageal reflux disease without esophagitis    Depression    Venous (peripheral) insufficiency    PMS (premenstrual syndrome)    Restless leg syndrome    Obesity due to excess calories    Monilial intertrigo    Iron deficiency anemia     Past Surgical History:   Procedure Laterality Date    SLEEVE GASTRECTOMY  12/4/12    lap    TONSILLECTOMY      TUBAL LIGATION      UPPER GASTROINTESTINAL ENDOSCOPY  9/14/12       Allergies   Allergen Reactions    Glucophage Xr [Metformin Hcl Er] Other (See Comments)     Gastrointestinal upset    Nitrofurantoin Macrocrystal Diarrhea     chills  chills    Penicillins      unknown    Sulfa Antibiotics Itching, Nausea Only and Rash     Outpatient Medications Marked as Taking for the 10/24/22 encounter (Office Visit) with ELI Kyle NP   Medication Sig Dispense Refill    omeprazole (PRILOSEC) 40 MG delayed release capsule TAKE 1 CAPSULE DAILY 90 capsule 0    buPROPion (WELLBUTRIN) 75 MG tablet Take 2 tablets by mouth in the morning and 2 tablets before bedtime. 360 tablet 3    nystatin (MYCOSTATIN) 745475 UNIT/GM cream Apply topically 2 times daily. 30 g 1    ferrous sulfate (IRON 325) 325 (65 Fe) MG tablet Take 1 tablet by mouth daily (with breakfast) 90 tablet 1    clonazePAM (KLONOPIN) 2 MG tablet Take 1 tablet by mouth 2 times daily as needed (restless leg syndrome) for up to 30 days.  60 tablet 0    sertraline (ZOLOFT) 100 MG tablet TAKE 2 TABLETS DAILY 180 tablet 1    ketoconazole (NIZORAL) 2 % cream Apply topically daily. 30 g 0    ibuprofen (ADVIL;MOTRIN) 800 MG tablet TAKE 1 TABLET THREE TIMES A  tablet 0    sucralfate (CARAFATE) 1 GM tablet TAKE 1 TABLET FOUR TIMES A  tablet 1       Social History     Tobacco Use    Smoking status: Former     Packs/day: 1.00     Years: 13.00     Pack years: 13.00     Types: Cigarettes     Start date: 1/1/1984     Quit date: 3/28/2012     Years since quitting: 10.5    Smokeless tobacco: Never   Substance Use Topics    Alcohol use: No     Family History   Problem Relation Age of Onset    Diabetes Father     High Blood Pressure Maternal Grandmother     High Cholesterol Maternal Grandmother     Arthritis Maternal Grandmother     High Blood Pressure Maternal Grandfather     High Cholesterol Maternal Grandfather     Heart Disease Maternal Grandfather     Stroke Paternal Grandmother     Diabetes Paternal Grandfather     Cancer Paternal Grandfather        Review of Systems  A comprehensive review of systems was negative except for what was noted in the HPI. Physical Exam   /86 (Site: Left Upper Arm, Position: Sitting, Cuff Size: Large Adult)   Pulse 94   Temp 97.9 °F (36.6 °C) (Infrared)   Ht 5' 4\" (1.626 m)   Wt 242 lb (109.8 kg)   SpO2 97%   BMI 41.54 kg/m²   Weight: 242 lb (109.8 kg)   Constitutional: She is oriented to person, place, and time. She appears well-developed and well-nourished. No distress. HENT:   Head: Normocephalic and atraumatic. Mouth/Throat: Uvula is midline, oropharynx is clear and moist and mucous membranes are normal.   Eyes: Conjunctivae and EOM are normal. Pupils are equal, round, and reactive to light. Neck: Trachea normal and normal range of motion. Neck supple. No JVD present. Carotid bruit is not present. No mass and no thyromegaly present. Cardiovascular: Normal rate, regular rhythm, normal heart sounds and intact distal pulses.   Exam reveals no gallop and no friction rub.  No murmur heard. Pulmonary/Chest: Effort normal and breath sounds normal. No respiratory distress. She has no wheezes. She has no rales. Abdominal: Soft. Normal aorta and bowel sounds are normal. She exhibits no distension and no mass. There is no hepatosplenomegaly. No tenderness. Musculoskeletal: She exhibits bilateral LE swelling, L knee in brace   Neurological: She is alert and oriented to person, place, and time. She has normal strength. No cranial nerve deficit or sensory deficit. Coordination and gait normal.   Skin: Skin is warm and dry. No rash noted. No erythema. EKG Interpretation:  NA, orders placed. Lab Review NA, reports has had labs completed, unable to access, will place orders       Assessment:       Janett Bassett was seen today for pre-op exam.    Diagnoses and all orders for this visit:    Preop examination  -     CBC with Auto Differential; Future  -     Microalbumin / Creatinine Urine Ratio; Future  -     Basic Metabolic Panel; Future  -     Protime-INR; Future  -     APTT; Future  -     Cancel: EKG 12 Lead  -     EKG 12 Lead    45 y.o. patient  approved for Surgery         Plan:     1. Preoperative workup as follows: ECG, labs  2. Change in medication regimen before surgery: hold NSAID 1 week prior, okay to take wellbutrin, clonazepam and zoloft on AM of surgery  3. Surgical clearance following EKG and lab results    Electronically signed by ELI Garnett NP on 10/24/22 at 4:52 PM EDT     This dictation was generated by voice recognition computer software. Although all attempts are made to edit the dictation for accuracy, there may be errors in the transcription that are not intended.

## 2022-10-26 ENCOUNTER — TELEPHONE (OUTPATIENT)
Dept: FAMILY MEDICINE CLINIC | Age: 52
End: 2022-10-26

## 2022-10-26 RX ORDER — SERTRALINE HYDROCHLORIDE 100 MG/1
TABLET, FILM COATED ORAL
Qty: 180 TABLET | Refills: 1 | Status: SHIPPED | OUTPATIENT
Start: 2022-10-26

## 2022-10-26 NOTE — TELEPHONE ENCOUNTER
Patient needs a refill on her Sertraline prescription to [facility]. Medication:   Requested Prescriptions     Pending Prescriptions Disp Refills    sertraline (ZOLOFT) 100 MG tablet 180 tablet 1     Sig: TAKE 2 TABLETS DAILY      Last Filled:      Patient Phone Number: [phone] (home)     Last appt:   Next appt: Visit date not found    Last OARRS:   RX Monitoring 3/29/2019   Attestation The Prescription Monitoring Report for this patient was reviewed today.    Periodic Controlled Substance Monitoring -     PDMP Monitoring:    Last PDMP Kristopher Miller as Reviewed [facility]):  Review User Review Instant Review Result   Telma Davis 8/4/2020  7:34 AM Reviewed PDMP [1]     Preferred Pharmacy:   [facility] [id number] Syed Keri, [address] [place] [address] Drive [phone] [facility] [phone]  [address] [place] [address] Road  [place] [address]  Phone: [phone] Fax: [phone] [facility], [address] [place] [address] [phone] - f [phone]  [place] [address]  Phone: [phone] Fax: [phone]    [facility] [id number] - ABVZLGISELE QJFCHNSA, [address] [place] [address] [phone] - f [phone]  [address] [place] [address]  Phone: [phone] Fax: [phone]

## 2022-10-26 NOTE — TELEPHONE ENCOUNTER
Patient is having total knee replacement surgery on 11/7/2022. She is wondering if she can have a script for a shower chair to make it easier for her to take her showers.      Please advise    Call back to patient when ready

## 2022-10-31 DIAGNOSIS — G25.81 RESTLESS LEG SYNDROME: ICD-10-CM

## 2022-10-31 RX ORDER — CLONAZEPAM 2 MG/1
2 TABLET ORAL 2 TIMES DAILY PRN
Qty: 60 TABLET | Refills: 0 | Status: SHIPPED | OUTPATIENT
Start: 2022-10-31 | End: 2022-11-30

## 2022-10-31 NOTE — TELEPHONE ENCOUNTER
Medication:   Requested Prescriptions     Pending Prescriptions Disp Refills    clonazePAM (KLONOPIN) 2 MG tablet 60 tablet 0     Sig: Take 1 tablet by mouth 2 times daily as needed (restless leg syndrome) for up to 30 days. Last Filled:  7/13/2022    Patient Phone Number: [phone] (home)     Last appt: 10/24/2022   Next appt: Visit date not found    Last OARRS:   RX Monitoring 3/29/2019   Attestation The Prescription Monitoring Report for this patient was reviewed today.    Periodic Controlled Substance Monitoring -     PDMP Monitoring:    Last PDMP Rafa Cohen as Reviewed [facility]):  Review User Review Instant Review Result   Steven Noguera 8/4/2020  7:34 AM Reviewed PDMP [1]     Preferred Pharmacy:   [facility] [id number] Maksimkrupa Brooks, [address] [phone] [facility] [phone]  [facility] [address]  [address] [place] [address]  Phone: [phone] Fax: [phone]

## 2022-10-31 NOTE — TELEPHONE ENCOUNTER
clonazePAM (KLONOPIN) 2 MG tablet [[id number]]  ENDED    Order Details  Dose: 2 mg Route: Oral Frequency: 2 TIMES DAILY PRN for restless leg syndrome   Dispense Quantity: 60 tablet Refills: 0          Sig: Take 1 tablet by mouth 2 times daily as needed (restless leg syndrome) for up to 30 days.          [facility] [id number]  Meredith Cramer[address], [address] [place] [address]   Phone:  [phone]  Fax:  [phone]

## 2022-11-13 DIAGNOSIS — K21.9 GASTROESOPHAGEAL REFLUX DISEASE WITHOUT ESOPHAGITIS: ICD-10-CM

## 2022-11-14 RX ORDER — OMEPRAZOLE 40 MG/1
CAPSULE, DELAYED RELEASE ORAL
Qty: 90 CAPSULE | Refills: 1 | Status: SHIPPED | OUTPATIENT
Start: 2022-11-14

## 2022-11-14 NOTE — TELEPHONE ENCOUNTER
Medication:   Requested Prescriptions     Pending Prescriptions Disp Refills    omeprazole (PRILOSEC) 40 MG delayed release capsule [Pharmacy Med Name: OMEPRAZOLE DR 40 MG CAPSULE] 90 capsule 0     Sig: TAKE ONE CAPSULE BY MOUTH DAILY      Last Filled:      Patient Phone Number: [phone] (home)     Last appt: 10/24/2022   Next appt: Visit date not found    Last OARRS:   RX Monitoring 3/29/2019   Attestation The Prescription Monitoring Report for this patient was reviewed today.    Periodic Controlled Substance Monitoring -     PDMP Monitoring:    Last PDMP [place] as Reviewed [facility]):  Review User Review Instant Review Result   Aravind Hickey 8/4/2020  7:34 AM Reviewed PDMP [1]     Preferred Pharmacy:   [facility] [id number] [facility], [address] [place] [address] Drive [phone] Ebony Schofield [phone]  [facility] [address]  [address] [place] [address]  Phone: [phone] Fax: [phone]

## 2022-11-28 RX ORDER — BUPROPION HYDROCHLORIDE 75 MG/1
150 TABLET ORAL 2 TIMES DAILY
Qty: 360 TABLET | Refills: 0 | Status: SHIPPED | OUTPATIENT
Start: 2022-11-28

## 2022-11-28 NOTE — TELEPHONE ENCOUNTER
Pt advise she still had refill on this medication.  Advise pharmacy will fill but she may have to pay out of pocket as this medication had refills on original prescription

## 2022-11-28 NOTE — TELEPHONE ENCOUNTER
Patient called stating that she lost her medication in the process of moving and was wanting to know if she [facility] could send another refill. buPROPion (WELLBUTRIN) 75 MG tablet [[id number]]     Order Details  Dose: 150 mg Route: Oral Frequency: 2 TIMES DAILY   Dispense Quantity: 360 tablet Refills: 3          Sig: Take 2 tablets by mouth in the morning and 2 tablets before bedtime.              [place] [id number] [address], [address] [place] [address]   Phone:  [phone]  Fax:  [phone]       Thank you

## 2022-11-28 NOTE — TELEPHONE ENCOUNTER
PT wanted her meds sent to: [facility] [address] [address] [address], [address]

## 2022-12-05 ENCOUNTER — HOSPITAL ENCOUNTER (OUTPATIENT)
Dept: PHYSICAL THERAPY | Age: 52
Setting detail: THERAPIES SERIES
Discharge: HOME | End: 2022-12-05
Payer: MEDICAID

## 2022-12-05 NOTE — FLOWSHEET NOTE
[id number]     Physical Therapy  Cancellation/No-show Note  Patient Name:  Etta Chopra  :  1970   Date:  2022  Cancelled visits to date: 1  No-shows to date: 0    Patient status for today's appointment patient:  [x]  Cancelled  eval  []  Rescheduled appointment  []  No-show     Reason given by patient:  [x]  Patient ill  []  Conflicting appointment  []  No transportation    []  Conflict with work  []  No reason given  []  Other:     Comments:      Phone call information:   []  Phone call made today to patient at _ time at number provided:      []  Patient answered, conversation as follows:    []  Patient did not answer, message left as follows:  []  Phone call not made today  [x]  Phone call not needed - pt contacted us to cancel and provided reason for cancellation.      Electronically signed by:  Renee Arnold, PT, DPT

## 2022-12-06 DIAGNOSIS — G25.81 RESTLESS LEG SYNDROME: ICD-10-CM

## 2022-12-06 RX ORDER — CLONAZEPAM 2 MG/1
2 TABLET ORAL 2 TIMES DAILY PRN
Qty: 60 TABLET | Refills: 0 | Status: SHIPPED | OUTPATIENT
Start: 2022-12-06 | End: 2023-01-05

## 2022-12-06 NOTE — TELEPHONE ENCOUNTER
Medication:   Requested Prescriptions     Pending Prescriptions Disp Refills    clonazePAM (KLONOPIN) 2 MG tablet 60 tablet 0     Sig: Take 1 tablet by mouth 2 times daily as needed (restless leg syndrome) for up to 30 days. Last Filled:  10/31/2022    Patient Phone Number: [phone] (home)     Last appt: 10/24/2022   Next appt: Visit date not found    Last OARRS:   RX Monitoring 3/29/2019   Attestation The Prescription Monitoring Report for this patient was reviewed today.    Periodic Controlled Substance Monitoring -     PDMP Monitoring:    Last PDMP [place] as Reviewed [facility]):  Review User Review Instant Review Result   Vane Wesley 8/4/2020  7:34 AM Reviewed PDMP [1]     Preferred Pharmacy:   [facility]o Martin [id number] [address] [phone] Micaela Bell [phone]  [address] [place] [address]  [place] [address]  Phone: [phone] Fax: [phone] [address] [phone]  f [phone]  Critical access hospital [address]  Phone: [phone] Fax: [phone]    [facility]  [id number] - [facility] [phone] - F [phone]  [address] [place] [address]  Phone: [phone] Fax: [phone]    [facility] [address] - [address] [phone] Micaela Bell [phone]  [address]  [place] [address]  Phone: [phone] Fax: [phone]

## 2022-12-06 NOTE — TELEPHONE ENCOUNTER
clonazePAM (KLONOPIN) 2 MG tablet [[id number]]  ENDED    Order Details  Dose: 2 mg Route: Oral Frequency: 2 TIMES DAILY PRN for restless leg syndrome   Dispense Quantity: 60 tablet Refills: 0          Sig: Take 1 tablet by mouth 2 times daily as needed (restless leg syndrome) for up to 30 days.        Katja Claudio [id number] [address], [address] [place] [address]   Phone:  [phone]  Fax:  [phone]

## 2022-12-07 ENCOUNTER — HOSPITAL ENCOUNTER (OUTPATIENT)
Dept: PHYSICAL THERAPY | Age: 52
Setting detail: THERAPIES SERIES
Discharge: HOME | End: 2022-12-07
Payer: MEDICAID

## 2022-12-07 PROCEDURE — 97530 THERAPEUTIC ACTIVITIES: CPT

## 2022-12-07 PROCEDURE — 97110 THERAPEUTIC EXERCISES: CPT

## 2022-12-07 PROCEDURE — 97161 PT EVAL LOW COMPLEX 20 MIN: CPT

## 2022-12-07 NOTE — FLOWSHEET NOTE
mobs/PROM       Tib/Fem Mobs       Patella Mobs       Ankle mobs                       Patient Education:  -pt educated on diagnosis, prognosis and expectations for rehab  -post-op instructions provided including but not limited to surgical procedure / precautions, S&S of infection and DVT, showering / hygiene, WB status and assistive devices  -all pt questions were answered    Home Exercise Program:    Access Code: [email or web address]  URL: Access Closure/  Date: 12/07/2022  Prepared by: Oneda Kristie    Exercises  Supine Active Straight Leg Raise - 2 x daily - 7 x weekly - 2-3 sets - 10 reps  Gastroc Stretch on Wall - 2 x daily - 7 x weekly - 2-3 sets - 10 reps  Supine Quad Set - 2 x daily - 7 x weekly - 2-3 sets - 10 reps  Supine Heel Slide - 2 x daily - 7 x weekly - 2-3 sets - 10 reps  Heel Raise - 2 x daily - 7 x weekly - 2-3 sets - 10 reps  Mini Squat with Counter Support - 2 x daily - 7 x weekly - 2-3 sets - 10 reps      Therapeutic Exercise and NMR EXR  [x] (65962) Provided verbal/tactile cueing for activities related to strengthening, flexibility, endurance, ROM for improvements in LE, proximal hip, and core control with self care, mobility, lifting, ambulation.  [] (89865) Provided verbal/tactile cueing for activities related to improving balance, coordination, kinesthetic sense, posture, motor skill, proprioception  to assist with LE, proximal hip, and core control in self care, mobility, lifting, ambulation and eccentric single leg control.      NMR and Therapeutic Activities:    [] (78719 or 75620) Provided verbal/tactile cueing for activities related to improving balance, coordination, kinesthetic sense, posture, motor skill, proprioception and motor activation to allow for proper function of core, proximal hip and LE with self care and ADLs  [] (83595) Gait Re-education- Provided training and instruction to the patient for proper LE, core and proximal hip recruitment and positioning and eccentric body weight control with ambulation re-education including up and down stairs     Home Exercise Program:    [x] (86171) Reviewed/Progressed HEP activities related to strengthening, flexibility, endurance, ROM of core, proximal hip and LE for functional self-care, mobility, lifting and ambulation/stair navigation   [] (14049)Reviewed/Progressed HEP activities related to improving balance, coordination, kinesthetic sense, posture, motor skill, proprioception of core, proximal hip and LE for self care, mobility, lifting, and ambulation/stair navigation      Manual Treatments:  PROM / STM / Oscillations-Mobs:  G-I, II, III, IV (PA's, Inf., Post.)  [] (03120) Provided manual therapy to mobilize LE, proximal hip and/or LS spine soft tissue/joints for the purpose of modulating pain, promoting relaxation,  increasing ROM, reducing/eliminating soft tissue swelling/inflammation/restriction, improving soft tissue extensibility and allowing for proper ROM for normal function with self care, mobility, lifting and ambulation. Modalities:  [x] (02699) Vasopneumatic compression: Utilized vasopneumatic compression to decrease edema / swelling for the purpose of improving mobility and quad tone / recruitment which will allow for increased overall function including but not limited to self-care, transfers, ambulation, and ascending / descending stairs. Modalities:      Charges:  Timed Code Treatment Minutes: 20   Total Treatment Minutes: 50     [x] EVAL - LOW (46129)   [] EVAL - MOD (08764)  [] EVAL - HIGH (49801)  [] RE-EVAL (56878)  [x] BC(42777) x 1     [] IONTO  [] NMR (61965) x     [] VASO  [] Manual (44476) x     [] Other:  [x] TA x 1      [] Mech Traction (94186)  [] ES(attended) (04603)     [] ES (un) (33715):     GOALS:    Patient stated goal: return to normal functioning, start exercising more  [] Progressing: [] Met: [] Not Met: [] Adjusted     Therapist goals for Patient:   Short Term Goals:  To be achieved in: 2 weeks  1. Independent in HEP and progression per patient tolerance, in order to prevent re-injury. [] Progressing: [] Met: [] Not Met: [] Adjusted  2. Patient will have a decrease in pain to facilitate improvement in movement, function, and ADLs as indicated by Functional Deficits. [] Progressing: [] Met: [] Not Met: [] Adjusted     Long Term Goals: To be achieved in:  8 weeks or discharge   1. FOTO score of at least  to assist 65 with reaching prior level of function. [] Progressing: [] Met: [] Not Met: [] Adjusted  2. Patient will demonstrate increased AROM to 0-120* left knee to allow for proper joint functioning as indicated by patients Functional Deficits. [] Progressing: [] Met: [] Not Met: [] Adjusted  3. Patient will demonstrate an increase in Strength to at least  5/5 left knee/hip as well as good proximal hip strength and control to allow for proper functional mobility as indicated by patients Functional Deficits. [] Progressing: [] Met: [] Not Met: [] Adjusted  4. Patient will return to functional activities including ascending/descending steps reciprocally and demonstrating normal ambulation with no evidence of antalgia without increased symptoms or restriction. [] Progressing: [] Met: [] Not Met: [] Adjusted    Overall Progression Towards Functional goals/ Treatment Progress Update:  [] Patient is progressing as expected towards functional goals listed. [] Progression is slowed due to complexities/Impairments listed. [] Progression has been slowed due to co-morbidities.   [x] Plan just implemented, too soon to assess goals progression <30days   [] Goals require adjustment due to lack of progress  [] Patient is not progressing as expected and requires additional follow up with physician  [] Other    Persisting Functional Limitations/Impairments:  []Sitting [x]Standing   [x]Walking []Squatting/bending    [x]Stairs [x]ADL's    []Transfers []Sleeping   [x]Housework []Job related tasks  []Driving []Sports/Recreation   []Other:    ASSESSMENT:  See eval  Treatment/Activity Tolerance:  [x] Patient able to complete tx [] Patient limited by fatique  [] Patient limited by pain  [] Patient limited by other medical complications  [] Other:     Prognosis: [x] Good [] Fair  [] Poor    Patient Requires Follow-up: [x] Yes  [] No    Return to Play:    [x]  N/A   []  Stage 1: Intro to Strength   []  Stage 2: Return to Run and Strength   []  Stage 3: Return to Jump and Strength   []  Stage 4: Dynamic Strength and Agility   []  Stage 5: Sport Specific Training     []  Ready to Return to Play, Meets All Above Stages   []  Not Ready for Return to Sports   Comments:            PLAN: See eval. PT 2x / wk for  8 weeks. [] Continue per plan of care [] Alter current plan (see comments)  [x] Plan of care initiated [] Hold pending MD visit [] Discharge    Electronically signed by: Debbie Mcnamara, PT PT    Note: If patient does not return for scheduled/ recommended follow up visits, this note will serve as a discharge from care along with most recent update on progress.

## 2022-12-07 NOTE — PLAN OF CARE
[place], [facility], [address]  Phone: [phone]   Fax: [phone]                                                       Physical Therapy Certification    Dear Isreal Bueno DO     We had the pleasure of evaluating the following patient for physical therapy services at [address] [place] [address]. A summary of our findings can be found in the initial assessment below. This includes our plan of care. If you have any questions or concerns regarding these findings, please do not hesitate to contact me at the office phone number checked above. Thank you for the referral.       Physician Signature:_______________________________Date:__________________  By signing above (or electronic signature), therapists plan is approved by physician      Patient: Akilah Sales   : 1970   MRN: [id number]  Referring Physician: Isreal Bueno DO       Evaluation Date: 2022      Medical Diagnosis Information:  Presence of left artificial knee joint [Z96.652]  DOS:    PT diagnosis:   Decreased post-op knee ROM, diminished strength, difficulty with gait, stairs, decreased functional activity tolerance/ADL status                                        Insurance information: Medicaid      Precautions/ Contra-indications:   Latex Allergy:  [x]NO      []YES  Preferred Language for Healthcare:   [x]English       []Other:    C-SSRS Triggered by Intake questionnaire (Past 2 wk assessment ):   [x] No, Questionnaire did not trigger screening.   [] Yes, Patient intake triggered C-SSRS Screening     [] Completed, no further action required. [] Completed, PCP notified via Epic    SUBJECTIVE: Patient stated complaint:  Pt presents to PT for post-op care after having a left TKR on  by Dr. Shimon Longoria with [facility].  She has been receiving home care since the surgery. She has been off an A.D. for the past 2 weeks. She wants to not be able to walk with a limp and return to normal functioning. Relevant Medical History: Anxiety, arthritis, GERD, Depression, restless leg syndrome, obesity, iron deficiency anemia , gastric sleeve/tuba ligation   Functional Outcome: FOTO physical FS primary measure score = 50; Risk adjusted = 31    Pain Scale: 3-6/10  Easing factors: rest  Provocative factors:  squatting. Type: []Constant   [x]Intermittent  []Radiating []Localized []other:     Numbness/Tingling: denies N/T    Occupation/School: currently unemployed    Living Status: 1st floor condo, with 7 steps with rail to get to main level. Lives alone    Prior Level of Function:Prior to this injury / incident, pt was independent with ADLs and IADLs. OBJECTIVE:   Palpation:     Quad tone:     Functional Mobility/Transfers:     Posture:     Bandages/Dressings/Incisions:  Post-op dressings removed. Incisions clean and dry, with no S&S of infection. Gait: (include devices/WB status) Ambulates with antalgic gait without A.D.      Dermatomes Normal Abnormal Comments   anterior mid-thigh (L2)      distal ant thigh/med knee (L3)      medial lower leg and foot (L4)      lateral lower leg and foot (L5)      posterior calf (S1)      medial calcaneus (S2)          Myotomes - NT due to recent surgery Normal Abnormal Comments   Hip flexion (L1-L2)      Knee extension (L2-L4)      Dorsiflexion (L4-L5)      Great Toe Ext (L5)      Ankle Eversion (S1-S2)      Ankle PF(S1-S2)          Reflexes - NT due to recent surgery Normal Abnormal Comments   S1-2 Seated achilles      S1-2 Prone knee bend      L3-4 Patellar tendon      Clonus      Babinski           PROM AROM    L R L R   Knee Flexion 100  98    Knee Extension   Lacking 8*         Strength (0-5)  Left Right   Hip Flexion - supine     Hip Flexion - seated     Hip Abduction     Hip Adduction     Quads 4 5   Hamstrings 4- 5 Flexibility - NT due to recent surgery     Hamstrings (90/90)     ITB Tresia Saint)     Quads (Ely's)     Hip Flexor Murlene Paddock)          Girth (cm)     Mid patella     Suprapatellar         Joint mobility:    []Normal    [x]Hypo   []Hyper    Orthopedic Special Tests:    Kavya's (-)     Balance:     TUMWT:  Not tested  Tandem: 5 secs L  SLS: N/T, unable                        [x] Patient history, allergies, meds reviewed. Medical chart reviewed. See intake form. Review Of Systems (ROS):  [x]Performed Review of systems (Integumentary, CardioPulmonary, Neurological) by intake and observation. Intake form has been scanned into medical record. Patient has been instructed to contact their primary care physician regarding ROS issues if not already being addressed at this time.       Co-morbidities/Complexities (which will affect course of rehabilitation):   []None        []Hx of COVID   Arthritic conditions   []Rheumatoid arthritis (M05.9)  [x]Osteoarthritis (M19.91)  []Gout   Cardiovascular conditions   []Hypertension (I10)  []Hyperlipidemia (E78.5)  []Angina pectoris (I20)  []Atherosclerosis (I70)  []Pacemaker  []Hx of CABG/stent/  cardiac surgeries   Musculoskeletal conditions   []Disc pathology   []Congenital spine pathologies   []Osteoporosis (M81.8)  []Osteopenia (M85.8)  []Scoliosis       Endocrine conditions   []Hypothyroid (E03.9)  []Hyperthyroid Gastrointestinal conditions   []Constipation (R45.10)   Metabolic conditions   []Morbid obesity (E66.01)  []Diabetes type 1(E10.65) or 2 (E11.65)   []Neuropathy (G60.9)     Cardio/Pulmonary conditions   []Asthma (J45)  []Coughing   []COPD (J44.9)  []CHF  []A-fib   Psychological Disorders  []Anxiety (F41.9)  []Depression (F32.9)   []Other:   Developmental Disorders  []Autism (F84.0)  []CP (G80)  []Down Syndrome (Q90.9)  []Developmental delay     Neurological conditions  []Prior Stroke (I69.30)  []Parkinson's (G20)  []Encephalopathy (G93.40)  []MS (G35)  []Post-polio (G14)  []SCI  []TBI  []ALS Other conditions  []Fibromyalgia (M79.7)  []Vertigo  []Syncope  []Kidney Failure  []Cancer      []currently undergoing                treatment  []Pregnancy  []Incontinence   Prior surgeries  []involved limb  []previous spinal surgery  [] section birth  []hysterectomy  []bowel / bladder surgery  []other relevant surgeries   []Other:              Barriers to/and or personal factors that will affect rehab potential:              []Age  []Sex    []Smoker              []Motivation/Lack of Motivation                        []Co-Morbidities              []Cognitive Function, education/learning barriers              []Environmental, home barriers              []profession/work barriers  [x]past PT/medical experience  []other:  Justification:     Falls Risk Assessment (30 days):   [x] Falls Risk assessed and no intervention required.   [] Falls Risk assessed and Patient requires intervention due to being higher risk   TUG score (>12s at risk):     [] Falls education provided, including         ASSESSMENT:   Functional Impairments:     [x]Noted lumbar/proximal hip/LE joint hypomobility   [x]Decreased LE functional ROM   []Decreased core/proximal hip strength and neuromuscular control   [x]Decreased LE functional strength   [x]Reduced balance/proprioceptive control   []other:      Functional Activity Limitations (from functional questionnaire and intake)   [x]Reduced ability to tolerate prolonged functional positions   []Reduced ability or difficulty with changes of positions or transfers between positions   []Reduced ability to maintain good posture and demonstrate good body mechanics with sitting, bending, and lifting   []Reduced ability to sleep   [] Reduced ability or tolerance with driving and/or computer work   []Reduced ability to perform lifting, carrying tasks   [x]Reduced ability to squat   []Reduced ability to forward bend   [x]Reduced ability to ambulate prolonged functional characteristics  [] unstable and unpredictable characteristics;   [x] Clinical decision making of [x] low, [] moderate, [] high complexity using standardized patient assessment instrument and/or measurable assessment of functional outcome. [x] EVAL (LOW) 83894 (typically 30 minutes face-to-face)  [] EVAL (MOD) 48364 (typically 30 minutes face-to-face)  [] EVAL (HIGH) 92585 (typically 45 minutes face-to-face)  [] RE-EVAL     PLAN:   Frequency/Duration:   2 days per week for  8 Weeks:  Interventions:  [x]  Therapeutic exercise including: strength training, ROM, for Lower extremity and core   [x]  NMR activation and proprioception for LE, Glutes and Core   [x]  Manual therapy as indicated for LE, Hip and spine to include: Dry Needling/IASTM, STM, PROM, Gr I-IV mobilizations, manipulation. [x] Modalities as needed that may include: thermal agents, E-stim, Biofeedback, US, iontophoresis as indicated  [x] Patient education on joint protection, postural re-education, activity modification, progression of HEP. HEP instruction: Pt provided with written and illustrated instructions for home program.     Access Code: Toribiojyoti Saad: [employer]/  Date: 12/07/2022  Prepared by: Edvin Life    Exercises  Supine Active Straight Leg Raise - 2 x daily - 7 x weekly - 2-3 sets - 10 reps  Gastroc Stretch on Wall - 2 x daily - 7 x weekly - 2-3 sets - 10 reps  Supine Quad Set - 2 x daily - 7 x weekly - 2-3 sets - 10 reps  Supine Heel Slide - 2 x daily - 7 x weekly - 2-3 sets - 10 reps  Heel Raise - 2 x daily - 7 x weekly - 2-3 sets - 10 reps  Mini Squat with Counter Support - 2 x daily - 7 x weekly - 2-3 sets - 10 reps      GOALS:  Patient stated goal: return to normal functioning, start exercising more  [] Progressing: [] Met: [] Not Met: [] Adjusted    Therapist goals for Patient:   Short Term Goals: To be achieved in: 2 weeks  1.  Independent in HEP and progression per patient tolerance, in order to prevent re-injury. [] Progressing: [] Met: [] Not Met: [] Adjusted  2. Patient will have a decrease in pain to facilitate improvement in movement, function, and ADLs as indicated by Functional Deficits. [] Progressing: [] Met: [] Not Met: [] Adjusted    Long Term Goals: To be achieved in:  8 weeks or discharge   1. FOTO score of at least  to assist 65 with reaching prior level of function. [] Progressing: [] Met: [] Not Met: [] Adjusted  2. Patient will demonstrate increased AROM to 0-120* left knee to allow for proper joint functioning as indicated by patients Functional Deficits. [] Progressing: [] Met: [] Not Met: [] Adjusted  3. Patient will demonstrate an increase in Strength to at least  5/5 left knee/hip as well as good proximal hip strength and control to allow for proper functional mobility as indicated by patients Functional Deficits. [] Progressing: [] Met: [] Not Met: [] Adjusted  4. Patient will return to functional activities including ascending/descending steps reciprocally and demonstrating normal ambulation with no evidence of antalgia without increased symptoms or restriction.    [] Progressing: [] Met: [] Not Met: [] Adjusted    Electronically signed by:  Phill Troncoso, PT

## 2022-12-09 ENCOUNTER — HOSPITAL ENCOUNTER (OUTPATIENT)
Dept: PHYSICAL THERAPY | Age: 52
Setting detail: THERAPIES SERIES
Discharge: HOME | End: 2022-12-09
Payer: MEDICAID

## 2022-12-09 NOTE — FLOWSHEET NOTE
[address] [place] [address] [employer]     Physical Therapy  Cancellation/No-show Note  Patient Name:  Chhaya Dietz  :  1970   Date:  2022  Cancelled visits to date: 0  No-shows to date: 1    Patient status for today's appointment patient:  []  Cancelled  []  Rescheduled appointment  [x]  No-show      Reason given by patient:  []  Patient ill  []  Conflicting appointment  []  No transportation    []  Conflict with work  []  No reason given  []  Other:     Comments:      Phone call information:   [x]  Phone call made today to patient at 3:35 pm at number provided:      []  Patient answered, conversation as follows:    [x]  Patient did not answer, message left as follows:  Notified of missed appointment, and reminded pt about her next scheduled appointment on . Left our phone # to call us with any questions. []  Phone call not made today  []  Phone call not needed - pt contacted us to cancel and provided reason for cancellation.      Electronically signed by:  Dolores Saint, PT

## 2022-12-12 ENCOUNTER — HOSPITAL ENCOUNTER (OUTPATIENT)
Dept: PHYSICAL THERAPY | Age: 52
Setting detail: THERAPIES SERIES
Discharge: HOME | End: 2022-12-12
Payer: MEDICAID

## 2022-12-12 PROCEDURE — 97016 VASOPNEUMATIC DEVICE THERAPY: CPT

## 2022-12-12 PROCEDURE — 97110 THERAPEUTIC EXERCISES: CPT

## 2022-12-12 PROCEDURE — 97140 MANUAL THERAPY 1/> REGIONS: CPT

## 2022-12-12 NOTE — FLOWSHEET NOTE
[address] [facility]  Phone: [phone]   Fax: [phone]    Physical Therapy Daily Treatment Note    Date:  2022     Patient Name:  Joby Rangel    :  1970  MRN: [id number]  Medical Diagnosis:  Presence of left artificial knee joint [Z96.652]  Treatment Diagnosis:  Decreased post-op knee ROM, diminished strength, difficulty with gait, stairs, decreased functional activity tolerance/ADL status          Insurance/Certification information:  Medicaid   Physician Information:  Marcel Cohen DO   Plan of care signed (Y/N): []  Yes [x]  No     Date of Patient follow up with Physician:      Progress Report: []  Yes  [x]  No     Date Range for reporting period:  Beginnin2022  PN:   Ending:     Progress report due (10 Rx/or 30 days whichever is less): visit #10 or 3/4/03     Recertification due (POC duration/ or 90 days whichever is less): visit # or     Visit # Insurance Allowable Auth required? Date Range    30/year  []  Yes  []  No        Units approved Units used Date Range          Latex Allergy:  [x]NO      []YES  Preferred Language for Healthcare:   [x]English       []other:    Functional Scale:            Date Assessed:   FOTO:   48;  31      22      Pain level:  2-3/10     SUBJECTIVE:  Pt reports she is doing ok. The exercises at home are going ok.      OBJECTIVE: See eval    Girth (cm) L - pre-vaso / post-vaso R - pre-vaso / post-vaso   Mid-patellar     Suprapatellar       : L AAROM 6-118 deg, fair quad set    RESTRICTIONS/PRECAUTIONS:     Exercises/Interventions:     Therapeutic Exercises  Resistance / level Sets/sec Reps Notes   Nustep/Bike  4 min     IB, HR/TR  30\"  2 2  10    HS Stretch  30\" 2 L           Quad Sets   10\" X 10     LAQ  2 10    SAQ 1# 2 10    Heel slides - knee flexion PROM  10\" 10 Slide board and stretch strap          SLR - flexion  2 10    SLR - abduction              3-way Hip Neuromuscular Re-ed / Therapeutic Activities       Balance        Step-Ups F/L       Heel to toe gait  4 min                          Manual Intervention       Knee mobs/PROM flexion and extension  X 10 min     Tib/Fem Mobs       Patella Mobs       Ankle mobs                       12/12: Vaso x 10 min pt in supine on mat table    Patient Education:  -pt educated on diagnosis, prognosis and expectations for rehab  -post-op instructions provided including but not limited to surgical procedure / precautions, S&S of infection and DVT, showering / hygiene, WB status and assistive devices  -all pt questions were answered    Home Exercise Program:    Access Code: [email or web address]  URL: [email or web address]  Date: 12/07/2022  Prepared by: Sarah Romero    Exercises  Supine Active Straight Leg Raise - 2 x daily - 7 x weekly - 2-3 sets - 10 reps  Gastroc Stretch on Wall - 2 x daily - 7 x weekly - 2-3 sets - 10 reps  Supine Quad Set - 2 x daily - 7 x weekly - 2-3 sets - 10 reps  Supine Heel Slide - 2 x daily - 7 x weekly - 2-3 sets - 10 reps  Heel Raise - 2 x daily - 7 x weekly - 2-3 sets - 10 reps  Mini Squat with Counter Support - 2 x daily - 7 x weekly - 2-3 sets - 10 reps      Therapeutic Exercise and NMR EXR  [x] (98959) Provided verbal/tactile cueing for activities related to strengthening, flexibility, endurance, ROM for improvements in LE, proximal hip, and core control with self care, mobility, lifting, ambulation.  [] (00193) Provided verbal/tactile cueing for activities related to improving balance, coordination, kinesthetic sense, posture, motor skill, proprioception  to assist with LE, proximal hip, and core control in self care, mobility, lifting, ambulation and eccentric single leg control.      NMR and Therapeutic Activities:    [] (64127 or 64199) Provided verbal/tactile cueing for activities related to improving balance, coordination, kinesthetic sense, posture, motor skill, proprioception and motor activation to allow for proper function of core, proximal hip and LE with self care and ADLs  [] (58440) Gait Re-education- Provided training and instruction to the patient for proper LE, core and proximal hip recruitment and positioning and eccentric body weight control with ambulation re-education including up and down stairs     Home Exercise Program:    [x] (53233) Reviewed/Progressed HEP activities related to strengthening, flexibility, endurance, ROM of core, proximal hip and LE for functional self-care, mobility, lifting and ambulation/stair navigation   [] (14269)Reviewed/Progressed HEP activities related to improving balance, coordination, kinesthetic sense, posture, motor skill, proprioception of core, proximal hip and LE for self care, mobility, lifting, and ambulation/stair navigation      Manual Treatments:  PROM / STM / Oscillations-Mobs:  G-I, II, III, IV (PA's, Inf., Post.)  [] (38070) Provided manual therapy to mobilize LE, proximal hip and/or LS spine soft tissue/joints for the purpose of modulating pain, promoting relaxation,  increasing ROM, reducing/eliminating soft tissue swelling/inflammation/restriction, improving soft tissue extensibility and allowing for proper ROM for normal function with self care, mobility, lifting and ambulation. Modalities:  [x] (55516) Vasopneumatic compression: Utilized vasopneumatic compression to decrease edema / swelling for the purpose of improving mobility and quad tone / recruitment which will allow for increased overall function including but not limited to self-care, transfers, ambulation, and ascending / descending stairs.        Modalities: Vaso x 10'       Charges:  Timed Code Treatment Minutes: 20   Total Treatment Minutes: 50     [] EVAL - LOW (58663)   [] EVAL - MOD (71016)  [] EVAL - HIGH (60715)  [] RE-EVAL (17327)  [x] JX(19279) x 2     [] IONTO  [] NMR (17605) x     [x] VASO  [x] Manual (96389) x  1   [] Other:  [] TA x 1      [] [facility] Traction (81908)  [] ES(attended) (81242)     [] ES (un) (40407):     GOALS:    Patient stated goal: return to normal functioning, start exercising more  [] Progressing: [] Met: [] Not Met: [] Adjusted     Therapist goals for Patient:   Short Term Goals: To be achieved in: 2 weeks  1. Independent in HEP and progression per patient tolerance, in order to prevent re-injury. [] Progressing: [] Met: [] Not Met: [] Adjusted  2. Patient will have a decrease in pain to facilitate improvement in movement, function, and ADLs as indicated by Functional Deficits. [] Progressing: [] Met: [] Not Met: [] Adjusted     Long Term Goals: To be achieved in:  8 weeks or discharge   1. FOTO score of at least  to assist 65 with reaching prior level of function. [] Progressing: [] Met: [] Not Met: [] Adjusted  2. Patient will demonstrate increased AROM to 0-120* left knee to allow for proper joint functioning as indicated by patients Functional Deficits. [] Progressing: [] Met: [] Not Met: [] Adjusted  3. Patient will demonstrate an increase in Strength to at least  5/5 left knee/hip as well as good proximal hip strength and control to allow for proper functional mobility as indicated by patients Functional Deficits. [] Progressing: [] Met: [] Not Met: [] Adjusted  4. Patient will return to functional activities including ascending/descending steps reciprocally and demonstrating normal ambulation with no evidence of antalgia without increased symptoms or restriction. [] Progressing: [] Met: [] Not Met: [] Adjusted    Overall Progression Towards Functional goals/ Treatment Progress Update:  [] Patient is progressing as expected towards functional goals listed. [] Progression is slowed due to complexities/Impairments listed. [] Progression has been slowed due to co-morbidities.   [x] Plan just implemented, too soon to assess goals progression <30days   [] Goals require adjustment due to lack of progress  [] Patient is not progressing as expected and requires additional follow up with physician  [] Other    Persisting Functional Limitations/Impairments:  []Sitting [x]Standing   [x]Walking []Squatting/bending    [x]Stairs [x]ADL's    []Transfers []Sleeping   [x]Housework []Job related tasks  []Driving []Sports/Recreation   []Other:    ASSESSMENT:  Pt presents with improved L knee extension and flexion ROM this date compared to eval. Quad set is good. Pt is able to perform SLR without extensor lag. Requires frequent cueing to maintain heel to toe gait. Continue to progress ROM and gross strength as able to tolerate. Revisit gait mechanics npv. Treatment/Activity Tolerance:  [x] Patient able to complete tx [] Patient limited by fatique  [] Patient limited by pain  [] Patient limited by other medical complications  [] Other:     Prognosis: [x] Good [] Fair  [] Poor    Patient Requires Follow-up: [x] Yes  [] No    Return to Play:    [x]  N/A   []  Stage 1: Intro to Strength   []  Stage 2: Return to Run and Strength   []  Stage 3: Return to Jump and Strength   []  Stage 4: Dynamic Strength and Agility   []  Stage 5: Sport Specific Training     []  Ready to Return to Play, Meets All Above Stages   []  Not Ready for Return to Sports   Comments:            PLAN: See eval. PT 2x / wk for  8 weeks. [x] Continue per plan of care [] Alter current plan (see comments)  [] Plan of care initiated [] Hold pending MD visit [] Discharge    Electronically signed by: Juanito Hilliard PT DPT    Note: If patient does not return for scheduled/ recommended follow up visits, this note will serve as a discharge from care along with most recent update on progress.

## 2022-12-14 ENCOUNTER — HOSPITAL ENCOUNTER (OUTPATIENT)
Dept: PHYSICAL THERAPY | Age: 52
Setting detail: THERAPIES SERIES
Discharge: HOME | End: 2022-12-14
Payer: MEDICAID

## 2022-12-14 PROCEDURE — 97140 MANUAL THERAPY 1/> REGIONS: CPT

## 2022-12-14 PROCEDURE — 97110 THERAPEUTIC EXERCISES: CPT

## 2022-12-14 PROCEDURE — 97016 VASOPNEUMATIC DEVICE THERAPY: CPT

## 2022-12-14 NOTE — FLOWSHEET NOTE
[address] [facility]  Phone: [phone]   Fax: [phone]    Physical Therapy Daily Treatment Note    Date:  2022     Patient Name:  David Santiago    :  1970  MRN: [id number]  Medical Diagnosis:  Presence of left artificial knee joint [Z96.652]  Treatment Diagnosis:  Decreased post-op knee ROM, diminished strength, difficulty with gait, stairs, decreased functional activity tolerance/ADL status          Insurance/Certification information:  Medicaid   Physician Information:  Carolina Dover DO   Plan of care signed (Y/N): []  Yes [x]  No     Date of Patient follow up with Physician:      Progress Report: []  Yes  [x]  No     Date Range for reporting period:  Beginnin2022  PN:   Ending:     Progress report due (10 Rx/or 30 days whichever is less): visit #10 or 4/3/61     Recertification due (POC duration/ or 90 days whichever is less): visit # or     Visit # Insurance Allowable Auth required? Date Range   3/16 30/year  []  Yes  []  No        Units approved Units used Date Range          Latex Allergy:  [x]NO      []YES  Preferred Language for Healthcare:   [x]English       []other:    Functional Scale:            Date Assessed:   FOTO:   48;  31      22      Pain level:  4/10     SUBJECTIVE:  Pt reports she does not know what's going on today, but it hurts so much. Tolerated last session ok. Pt will be traveling to [address] [place] [address] for a week on 1/15/22.     OBJECTIVE: See eval    Girth (cm) L - pre-vaso / post-vaso R - pre-vaso / post-vaso   Mid-patellar     Suprapatellar       : L AAROM 6-118 deg, fair quad set  : L knee AAROM flexion 108    RESTRICTIONS/PRECAUTIONS:     Exercises/Interventions:     Therapeutic Exercises  Resistance / level Sets/sec Reps Notes   Nustep/Bike  4 min  : stepper this date   IB, HR/TR  30\"  2 2  10    HS Stretch  30\" 2 L           Quad Sets      LAQ  2 10    SAQ 1# 2 10    Heel slides - knee flexion PROM  10\" 10 Slide board and stretch strap          SLR - flexion  2 10    SLR - abduction              Standing hip abd  2 10 L    Resisted TKE orange 2 10           Neuromuscular Re-ed / Therapeutic Activities       Balance        Step-Ups F/L       Heel to toe gait  4 min                          Manual Intervention       Knee mobs/PROM flexion and extension  X 10 min     Tib/Fem Mobs       Patella Mobs       Ankle mobs                       12/12: Vaso x 10 min pt in supine on mat table    Patient Education:  -pt educated on diagnosis, prognosis and expectations for rehab  -post-op instructions provided including but not limited to surgical procedure / precautions, S&S of infection and DVT, showering / hygiene, WB status and assistive devices  -all pt questions were answered    Home Exercise Program:    Access Code: [email or web address]  URL: [email or web address]  Date: 12/07/2022  Prepared by: Jigna Bhatti    Exercises  Supine Active Straight Leg Raise - 2 x daily - 7 x weekly - 2-3 sets - 10 reps  Gastroc Stretch on Wall - 2 x daily - 7 x weekly - 2-3 sets - 10 reps  Supine Quad Set - 2 x daily - 7 x weekly - 2-3 sets - 10 reps  Supine Heel Slide - 2 x daily - 7 x weekly - 2-3 sets - 10 reps  Heel Raise - 2 x daily - 7 x weekly - 2-3 sets - 10 reps  Mini Squat with Counter Support - 2 x daily - 7 x weekly - 2-3 sets - 10 reps      Therapeutic Exercise and NMR EXR  [x] (55301) Provided verbal/tactile cueing for activities related to strengthening, flexibility, endurance, ROM for improvements in LE, proximal hip, and core control with self care, mobility, lifting, ambulation.  [] (38936) Provided verbal/tactile cueing for activities related to improving balance, coordination, kinesthetic sense, posture, motor skill, proprioception  to assist with LE, proximal hip, and core control in self care, mobility, lifting, ambulation and eccentric single leg control.      NMR and Therapeutic Activities:    [] (45566 or 50108) Provided verbal/tactile cueing for activities related to improving balance, coordination, kinesthetic sense, posture, motor skill, proprioception and motor activation to allow for proper function of core, proximal hip and LE with self care and ADLs  [] (51121) Gait Re-education- Provided training and instruction to the patient for proper LE, core and proximal hip recruitment and positioning and eccentric body weight control with ambulation re-education including up and down stairs     Home Exercise Program:    [x] (64034) Reviewed/Progressed HEP activities related to strengthening, flexibility, endurance, ROM of core, proximal hip and LE for functional self-care, mobility, lifting and ambulation/stair navigation   [] (07943)Reviewed/Progressed HEP activities related to improving balance, coordination, kinesthetic sense, posture, motor skill, proprioception of core, proximal hip and LE for self care, mobility, lifting, and ambulation/stair navigation      Manual Treatments:  PROM / STM / Oscillations-Mobs:  G-I, II, III, IV (PA's, Inf., Post.)  [] (61534) Provided manual therapy to mobilize LE, proximal hip and/or LS spine soft tissue/joints for the purpose of modulating pain, promoting relaxation,  increasing ROM, reducing/eliminating soft tissue swelling/inflammation/restriction, improving soft tissue extensibility and allowing for proper ROM for normal function with self care, mobility, lifting and ambulation. Modalities:  [x] (83205) Vasopneumatic compression: Utilized vasopneumatic compression to decrease edema / swelling for the purpose of improving mobility and quad tone / recruitment which will allow for increased overall function including but not limited to self-care, transfers, ambulation, and ascending / descending stairs.        Modalities: Vaso x 10'       Charges:  Timed Code Treatment Minutes: 45   Total Treatment Minutes: 55     [] EVAL - LOW (72193)   [] EVAL - MOD (30278)  [] EVAL - HIGH (45403)  [] RE-EVAL (89113)  [x] BIRAN(69968) x 2     [] IONTO  [] NMR (50494) x     [x] VASO  [x] Manual (86591) x  1   [] Other:  [] TA x 1      [] [employer] Traction (24865)  [] ES(attended) (55902)     [] ES (un) (23211):     GOALS:    Patient stated goal: return to normal functioning, start exercising more  [] Progressing: [] Met: [] Not Met: [] Adjusted     Therapist goals for Patient:   Short Term Goals: To be achieved in: 2 weeks  1. Independent in HEP and progression per patient tolerance, in order to prevent re-injury. [] Progressing: [] Met: [] Not Met: [] Adjusted  2. Patient will have a decrease in pain to facilitate improvement in movement, function, and ADLs as indicated by Functional Deficits. [] Progressing: [] Met: [] Not Met: [] Adjusted     Long Term Goals: To be achieved in:  8 weeks or discharge   1. FOTO score of at least  to assist 65 with reaching prior level of function. [] Progressing: [] Met: [] Not Met: [] Adjusted  2. Patient will demonstrate increased AROM to 0-120* left knee to allow for proper joint functioning as indicated by patients Functional Deficits. [] Progressing: [] Met: [] Not Met: [] Adjusted  3. Patient will demonstrate an increase in Strength to at least  5/5 left knee/hip as well as good proximal hip strength and control to allow for proper functional mobility as indicated by patients Functional Deficits. [] Progressing: [] Met: [] Not Met: [] Adjusted  4. Patient will return to functional activities including ascending/descending steps reciprocally and demonstrating normal ambulation with no evidence of antalgia without increased symptoms or restriction. [] Progressing: [] Met: [] Not Met: [] Adjusted    Overall Progression Towards Functional goals/ Treatment Progress Update:  [] Patient is progressing as expected towards functional goals listed. [] Progression is slowed due to complexities/Impairments listed.   [] Progression has been slowed due to co-morbidities. [x] Plan just implemented, too soon to assess goals progression <30days   [] Goals require adjustment due to lack of progress  [] Patient is not progressing as expected and requires additional follow up with physician  [] Other    Persisting Functional Limitations/Impairments:  []Sitting [x]Standing   [x]Walking []Squatting/bending    [x]Stairs [x]ADL's    []Transfers []Sleeping   [x]Housework []Job related tasks  []Driving []Sports/Recreation   []Other:    ASSESSMENT:  Pt presents significant increase in pain and antalgic gait this date. She is able to tolerate above interventions including progressions seen in bold. Continues to requires frequent cueing to maintain heel to toe gait. Continue to progress ROM and gross strength as able to tolerate. Pt plans to travel out of state 1/15/22. Provide with updated HEP prior to travel. Treatment/Activity Tolerance:  [x] Patient able to complete tx [] Patient limited by fatique  [] Patient limited by pain  [] Patient limited by other medical complications  [] Other:     Prognosis: [x] Good [] Fair  [] Poor    Patient Requires Follow-up: [x] Yes  [] No    Return to Play:    [x]  N/A   []  Stage 1: Intro to Strength   []  Stage 2: Return to Run and Strength   []  Stage 3: Return to Jump and Strength   []  Stage 4: Dynamic Strength and Agility   []  Stage 5: Sport Specific Training     []  Ready to Return to Play, Meets All Above Stages   []  Not Ready for Return to Sports   Comments:            PLAN: See eval. PT 2x / wk for  8 weeks. [x] Continue per plan of care [] Alter current plan (see comments)  [] Plan of care initiated [] Hold pending MD visit [] Discharge    Electronically signed by: Zelda Card, PT DPT    Note: If patient does not return for scheduled/ recommended follow up visits, this note will serve as a discharge from care along with most recent update on progress.

## 2022-12-19 ENCOUNTER — HOSPITAL ENCOUNTER (OUTPATIENT)
Dept: PHYSICAL THERAPY | Age: 52
Setting detail: THERAPIES SERIES
Discharge: HOME | End: 2022-12-19
Payer: MEDICAID

## 2022-12-19 PROCEDURE — 97016 VASOPNEUMATIC DEVICE THERAPY: CPT

## 2022-12-19 PROCEDURE — 97110 THERAPEUTIC EXERCISES: CPT

## 2022-12-19 PROCEDURE — 97140 MANUAL THERAPY 1/> REGIONS: CPT

## 2022-12-19 PROCEDURE — 97530 THERAPEUTIC ACTIVITIES: CPT

## 2022-12-19 NOTE — FLOWSHEET NOTE
[facility]  Phone: [phone]   Fax: [phone]    Physical Therapy Daily Treatment Note    Date:  2022     Patient Name:  Herb Javier    :  1970  MRN: [id number]  Medical Diagnosis:  Presence of left artificial knee joint [Z96.652]  Treatment Diagnosis:  Decreased post-op knee ROM, diminished strength, difficulty with gait, stairs, decreased functional activity tolerance/ADL status          Insurance/Certification information:  Medicaid   Physician Information:  Alice Cuellar DO   Plan of care signed (Y/N): []  Yes [x]  No     Date of Patient follow up with Physician:      Progress Report: []  Yes  [x]  No     Date Range for reporting period:  Beginnin2022  PN:   Ending:     Progress report due (10 Rx/or 30 days whichever is less): visit #10 or 66     Recertification due (POC duration/ or 90 days whichever is less): visit # or     Visit # Insurance Allowable Auth required? Date Range     []  Yes  []  No        Units approved Units used Date Range          Latex Allergy:  [x]NO      []YES  Preferred Language for Healthcare:   [x]English       []other:    Functional Scale:            Date Assessed:   FOTO:   48;  31      22      Pain level:  2/10     SUBJECTIVE:  Pt reports she continues to have increased pain this date. She thinks it may be the weather. She did tolerate the last session well.     does not know what's going on today, but it hurts so much. Tolerated last session ok. Pt will be traveling to [address] [place] [address] for a week on 1/15/22.     OBJECTIVE: See eval    Girth (cm) L - pre-vaso / post-vaso R - pre-vaso / post-vaso   Mid-patellar     Suprapatellar       : L AAROM 6-118 deg, fair quad set  : L knee AAROM flexion 108  : L knee AAROM 4-115 deg    RESTRICTIONS/PRECAUTIONS:     Exercises/Interventions:     Therapeutic Exercises  Resistance / level Sets/sec Reps Notes Nustep/Bike  5 min  12/14: stepper this date   IB, HR/TR  30\"  2 2  10    HS Stretch-at stairs  30\" 2 L    Knee flexion AAROM stairs  20\" 2 L    Quad Sets      LAQ  2 10    SAQ 1#    Heel slides - knee flexion PROM  10\" 10 Slide board and stretch strap          SLR - flexion  2 10 L    SLR - abduction standing  2 10 B                  Resisted TKE orange           Leg Press       Leg Ext       Leg Curl       Neuromuscular Re-ed / Therapeutic Activities       Tandem Balance   30\" 2 B    Step-Ups F/L 4\" 1 10 L 12/19-pt expresses fear for 6\" step this date   Gait ed  4 min     Step over hurdles  2 laps  12/19                 Manual Intervention       Knee mobs/PROM flexion and extension  X 10 min     Tib/Fem Mobs       Patella Mobs       Ankle mobs                       12/12: Vaso x 10 min pt in supine on mat table    Patient Education:  -pt educated on diagnosis, prognosis and expectations for rehab  -post-op instructions provided including but not limited to surgical procedure / precautions, S&S of infection and DVT, showering / hygiene, WB status and assistive devices  -all pt questions were answered    Home Exercise Program:    Access Code: [email or web address]  URL: [email or web address]  Date: 12/07/2022  Prepared by: Debbie Mcnamara    Exercises  Supine Active Straight Leg Raise - 2 x daily - 7 x weekly - 2-3 sets - 10 reps  Gastroc Stretch on Wall - 2 x daily - 7 x weekly - 2-3 sets - 10 reps  Supine Quad Set - 2 x daily - 7 x weekly - 2-3 sets - 10 reps  Supine Heel Slide - 2 x daily - 7 x weekly - 2-3 sets - 10 reps  Heel Raise - 2 x daily - 7 x weekly - 2-3 sets - 10 reps  Mini Squat with Counter Support - 2 x daily - 7 x weekly - 2-3 sets - 10 reps      Therapeutic Exercise and NMR EXR  [x] (94470) Provided verbal/tactile cueing for activities related to strengthening, flexibility, endurance, ROM for improvements in LE, proximal hip, and core control with self care, mobility, lifting, ambulation.  [] (78867) Provided verbal/tactile cueing for activities related to improving balance, coordination, kinesthetic sense, posture, motor skill, proprioception  to assist with LE, proximal hip, and core control in self care, mobility, lifting, ambulation and eccentric single leg control. NMR and Therapeutic Activities:    [] (60510 or 41123) Provided verbal/tactile cueing for activities related to improving balance, coordination, kinesthetic sense, posture, motor skill, proprioception and motor activation to allow for proper function of core, proximal hip and LE with self care and ADLs  [] (27426) Gait Re-education- Provided training and instruction to the patient for proper LE, core and proximal hip recruitment and positioning and eccentric body weight control with ambulation re-education including up and down stairs     Home Exercise Program:    [x] (41330) Reviewed/Progressed HEP activities related to strengthening, flexibility, endurance, ROM of core, proximal hip and LE for functional self-care, mobility, lifting and ambulation/stair navigation   [] (22992)Reviewed/Progressed HEP activities related to improving balance, coordination, kinesthetic sense, posture, motor skill, proprioception of core, proximal hip and LE for self care, mobility, lifting, and ambulation/stair navigation      Manual Treatments:  PROM / STM / Oscillations-Mobs:  G-I, II, III, IV (PA's, Inf., Post.)  [] (71764) Provided manual therapy to mobilize LE, proximal hip and/or LS spine soft tissue/joints for the purpose of modulating pain, promoting relaxation,  increasing ROM, reducing/eliminating soft tissue swelling/inflammation/restriction, improving soft tissue extensibility and allowing for proper ROM for normal function with self care, mobility, lifting and ambulation.      Modalities:  [x] (12506) Vasopneumatic compression: Utilized vasopneumatic compression to decrease edema / swelling for the purpose of improving mobility and quad tone / recruitment which will allow for increased overall function including but not limited to self-care, transfers, ambulation, and ascending / descending stairs. Modalities: Vaso x 10'       Charges:  Timed Code Treatment Minutes: 48   Total Treatment Minutes: 58     [] EVAL - LOW (84988)   [] EVAL - MOD (79903)  [] EVAL - HIGH (60012)  [] RE-EVAL (94309)  [x] QO(58621) x 1     [] IONTO  [] NMR (60965) x     [x] VASO  [x] Manual (64969) x  1   [] Other:  [x] TA x 1      [] Mech Traction (89614)  [] ES(attended) (83253)     [] ES (un) (38781):     GOALS:    Patient stated goal: return to normal functioning, start exercising more  [] Progressing: [] Met: [] Not Met: [] Adjusted     Therapist goals for Patient:   Short Term Goals: To be achieved in: 2 weeks  1. Independent in HEP and progression per patient tolerance, in order to prevent re-injury. [] Progressing: [] Met: [] Not Met: [] Adjusted  2. Patient will have a decrease in pain to facilitate improvement in movement, function, and ADLs as indicated by Functional Deficits. [] Progressing: [] Met: [] Not Met: [] Adjusted     Long Term Goals: To be achieved in:  8 weeks or discharge   1. FOTO score of at least  to assist 65 with reaching prior level of function. [] Progressing: [] Met: [] Not Met: [] Adjusted  2. Patient will demonstrate increased AROM to 0-120* left knee to allow for proper joint functioning as indicated by patients Functional Deficits. [] Progressing: [] Met: [] Not Met: [] Adjusted  3. Patient will demonstrate an increase in Strength to at least  5/5 left knee/hip as well as good proximal hip strength and control to allow for proper functional mobility as indicated by patients Functional Deficits. [] Progressing: [] Met: [] Not Met: [] Adjusted  4.  Patient will return to functional activities including ascending/descending steps reciprocally and demonstrating normal ambulation with no evidence of antalgia without increased symptoms or restriction. [] Progressing: [] Met: [] Not Met: [] Adjusted    Overall Progression Towards Functional goals/ Treatment Progress Update:  [] Patient is progressing as expected towards functional goals listed. [] Progression is slowed due to complexities/Impairments listed. [] Progression has been slowed due to co-morbidities. [x] Plan just implemented, too soon to assess goals progression <30days   [] Goals require adjustment due to lack of progress  [] Patient is not progressing as expected and requires additional follow up with physician  [] Other    Persisting Functional Limitations/Impairments:  []Sitting [x]Standing   [x]Walking []Squatting/bending    [x]Stairs [x]ADL's    []Transfers []Sleeping   [x]Housework []Job related tasks  []Driving []Sports/Recreation   []Other:    ASSESSMENT:  Pt presents improved pain score and ROM (4-115) this date compared to previous visit. She does however continue to require frequent cueing to maintain heel to toe gait and to avoid circumduction at the hip for step ups. Pt is able to complete step ups independent with good TKE, but expresses fear of falling at 6\" step height. She is able to tolerate above interventions including progressions seen in bold. Continue to progress ROM and gross strength as able to tolerate. Pt plans to travel out of state 1/15/22. Provide with updated HEP prior to travel.     Treatment/Activity Tolerance:  [x] Patient able to complete tx [] Patient limited by fatique  [] Patient limited by pain  [] Patient limited by other medical complications  [] Other:     Prognosis: [x] Good [] Fair  [] Poor    Patient Requires Follow-up: [x] Yes  [] No    Return to Play:    [x]  N/A   []  Stage 1: Intro to Strength   []  Stage 2: Return to Run and Strength   []  Stage 3: Return to Jump and Strength   []  Stage 4: Dynamic Strength and Agility   []  Stage 5: Sport Specific Training     []  Ready to Return to Play, Meets All Above Stages   []  Not Ready for Return to Sports   Comments:            PLAN: See eval. PT 2x / wk for  8 weeks. [x] Continue per plan of care [] Alter current plan (see comments)  [] Plan of care initiated [] Hold pending MD visit [] Discharge    Electronically signed by: Carmen Wagoner, PT DPT    Note: If patient does not return for scheduled/ recommended follow up visits, this note will serve as a discharge from care along with most recent update on progress.

## 2022-12-20 ENCOUNTER — TELEPHONE (OUTPATIENT)
Dept: FAMILY MEDICINE CLINIC | Age: 52
End: 2022-12-20

## 2022-12-20 NOTE — TELEPHONE ENCOUNTER
omeprazole (PRILOSEC) 40 MG delayed release capsule [[id number]]     Order Details  Dose, Route, Frequency: As Directed   Dispense Quantity: 90 capsule Refills: 1          Sig: TAKE ONE CAPSULE BY MOUTH DAILY   [facility] [id number] - [place] [address] [place] [address]   Phone:  [phone]  Fax:  [phone]

## 2022-12-21 ENCOUNTER — HOSPITAL ENCOUNTER (OUTPATIENT)
Dept: PHYSICAL THERAPY | Age: 52
Setting detail: THERAPIES SERIES
Discharge: HOME | End: 2022-12-21
Payer: MEDICAID

## 2022-12-21 NOTE — PROGRESS NOTES
[address] [place] [address] [employer]     Physical Therapy  Cancellation/No-show Note  Patient Name:  Bridget Morales  :  1970   Date:  2022  Cancelled visits to date: 1  No-shows to date: 0    Patient status for today's appointment patient:  [x]  Cancelled   []  Rescheduled appointment  []  No-show     Reason given by patient:  []  Patient ill  []  Conflicting appointment  []  No transportation    []  Conflict with work  []  No reason given  [x]  Other:     Comments: Had financial matters to attend to     Phone call information:   []  Phone call made today to patient at _ time at number provided:      []  Patient answered, conversation as follows:    []  Patient did not answer, message left as follows:  []  Phone call not made today  [x]  Phone call not needed - pt contacted us to cancel and provided reason for cancellation.      Electronically signed by:  Margarita Lazcano PT

## 2022-12-28 ENCOUNTER — HOSPITAL ENCOUNTER (OUTPATIENT)
Dept: PHYSICAL THERAPY | Age: 52
Setting detail: THERAPIES SERIES
Discharge: HOME | End: 2022-12-28
Payer: MEDICAID

## 2022-12-28 PROCEDURE — 97016 VASOPNEUMATIC DEVICE THERAPY: CPT

## 2022-12-28 PROCEDURE — 97140 MANUAL THERAPY 1/> REGIONS: CPT

## 2022-12-28 PROCEDURE — 97110 THERAPEUTIC EXERCISES: CPT

## 2022-12-28 NOTE — FLOWSHEET NOTE
[facility]  Phone: [phone]   Fax: [phone]    Physical Therapy Daily Treatment Note    Date:  2022     Patient Name:  Alfonso Carreon    :  1970  MRN: [id number]  Medical Diagnosis:  Presence of left artificial knee joint [Z96.652]  Treatment Diagnosis:  Decreased post-op knee ROM, diminished strength, difficulty with gait, stairs, decreased functional activity tolerance/ADL status          Insurance/Certification information:  Medicaid   Physician Information:  Jay Malik DO   Plan of care signed (Y/N): []  Yes [x]  No     Date of Patient follow up with Physician: 4 weeks from previous visit (around 22)     Progress Report: []  Yes  [x]  No     Date Range for reporting period:  Beginnin2022  PN:   Ending:     Progress report due (10 Rx/or 30 days whichever is less): visit #10 or      Recertification due (POC duration/ or 90 days whichever is less): visit #16 or 3/7/23    Visit # Insurance Allowable Auth required? Date Range    30/year  []  Yes  []  No        Units approved Units used Date Range          Latex Allergy:  [x]NO      []YES  Preferred Language for Healthcare:   [x]English       []other:    Functional Scale:            Date Assessed:   FOTO:   48;  31      22      Pain level:  0.5/10     SUBJECTIVE:  Pt reports she is feeling better than she was at the past 2 visits. *Pt will be traveling to [address] [place] [address] for a week on 1/15/22.     OBJECTIVE: See eval    Girth (cm) L - pre-vaso / post-vaso R - pre-vaso / post-vaso   Mid-patellar     Suprapatellar       : L AAROM 6-118 deg, fair quad set  : L knee AAROM flexion 108  : L knee AAROM 4-115 deg  : L knee AROM: 116 deg, PROM 134 deg    RESTRICTIONS/PRECAUTIONS:     Exercises/Interventions:     Therapeutic Exercises  Resistance / level Sets/sec Reps Notes   Nustep/Bike  5 min  : stepper this date   IB, HR/TR  30\"  2 2  10    HS Stretch-at stairs  30\" 2 L    Knee flexion AAROM stairs  20\" 2 L    Quad Sets      LAQ 2# 2 10    SAQ 1#    Heel slides - knee flexion PROM  10\" 10 Slide board and stretch strap   Clamshell   1 10 L    SLR - flexion  2 10 L    SLR - abduction standing Lime above knee 2 10 B    Prone quad stretch              Resisted TKE           Leg Press npv      Leg Ext npv      Leg Curl npv             Neuromuscular Re-ed / Therapeutic Activities       Tandem Balance   30\" 2 B    Step-Ups F/L 6\" 2 10 L 12/19-pt expresses fear for 6\" step this date   Gait ed  4 min     Step over hurdles   12/19   Sit to stand   1 10 Light UE assist at table-19\"          Manual Intervention       Knee mobs/PROM flexion and extension  X 8 min     Tib/Fem Mobs       Patella Mobs       Ankle mobs                       12/28: Vaso x 10 min pt in supine on mat table    Patient Education:  -pt educated on diagnosis, prognosis and expectations for rehab  -post-op instructions provided including but not limited to surgical procedure / precautions, S&S of infection and DVT, showering / hygiene, WB status and assistive devices  -all pt questions were answered    Home Exercise Program:    Access Code: [email or web address]  URL: [email or web address]  Date: 12/07/2022  Prepared by: Mariel Sarah    Exercises  Supine Active Straight Leg Raise - 2 x daily - 7 x weekly - 2-3 sets - 10 reps  Gastroc Stretch on Wall - 2 x daily - 7 x weekly - 2-3 sets - 10 reps  Supine Quad Set - 2 x daily - 7 x weekly - 2-3 sets - 10 reps  Supine Heel Slide - 2 x daily - 7 x weekly - 2-3 sets - 10 reps  Heel Raise - 2 x daily - 7 x weekly - 2-3 sets - 10 reps  Mini Squat with Counter Support - 2 x daily - 7 x weekly - 2-3 sets - 10 reps      Therapeutic Exercise and NMR EXR  [x] (17778) Provided verbal/tactile cueing for activities related to strengthening, flexibility, endurance, ROM for improvements in LE, proximal hip, and core control with self care, mobility, lifting, ambulation.  [] (53811) Provided verbal/tactile cueing for activities related to improving balance, coordination, kinesthetic sense, posture, motor skill, proprioception  to assist with LE, proximal hip, and core control in self care, mobility, lifting, ambulation and eccentric single leg control. NMR and Therapeutic Activities:    [] (93222 or 21106) Provided verbal/tactile cueing for activities related to improving balance, coordination, kinesthetic sense, posture, motor skill, proprioception and motor activation to allow for proper function of core, proximal hip and LE with self care and ADLs  [] (16594) Gait Re-education- Provided training and instruction to the patient for proper LE, core and proximal hip recruitment and positioning and eccentric body weight control with ambulation re-education including up and down stairs     Home Exercise Program:    [x] (20850) Reviewed/Progressed HEP activities related to strengthening, flexibility, endurance, ROM of core, proximal hip and LE for functional self-care, mobility, lifting and ambulation/stair navigation   [] (30629)Reviewed/Progressed HEP activities related to improving balance, coordination, kinesthetic sense, posture, motor skill, proprioception of core, proximal hip and LE for self care, mobility, lifting, and ambulation/stair navigation      Manual Treatments:  PROM / STM / Oscillations-Mobs:  G-I, II, III, IV (PA's, Inf., Post.)  [] (28427) Provided manual therapy to mobilize LE, proximal hip and/or LS spine soft tissue/joints for the purpose of modulating pain, promoting relaxation,  increasing ROM, reducing/eliminating soft tissue swelling/inflammation/restriction, improving soft tissue extensibility and allowing for proper ROM for normal function with self care, mobility, lifting and ambulation.      Modalities:  [x] (21828) Vasopneumatic compression: Utilized vasopneumatic compression to decrease edema / swelling for the purpose of improving mobility and quad tone / recruitment which will allow for increased overall function including but not limited to self-care, transfers, ambulation, and ascending / descending stairs. Modalities: Vaso x 10'       Charges:  Timed Code Treatment Minutes: 46   Total Treatment Minutes: 56     [] EVAL - LOW (86544)   [] EVAL - MOD (77539)  [] EVAL - HIGH (56031)  [] RE-EVAL (00458)  [x] FV(65869) x 1     [] IONTO  [] NMR (76529) x     [x] VASO  [x] Manual (83098) x  1   [] Other:  [x] TA x 1      [] Mech Traction (14590)  [] ES(attended) (71078)     [] ES (un) (94423):     GOALS:    Patient stated goal: return to normal functioning, start exercising more  [] Progressing: [] Met: [] Not Met: [] Adjusted     Therapist goals for Patient:   Short Term Goals: To be achieved in: 2 weeks  1. Independent in HEP and progression per patient tolerance, in order to prevent re-injury. [] Progressing: [] Met: [] Not Met: [] Adjusted  2. Patient will have a decrease in pain to facilitate improvement in movement, function, and ADLs as indicated by Functional Deficits. [] Progressing: [] Met: [] Not Met: [] Adjusted     Long Term Goals: To be achieved in:  8 weeks or discharge   1. FOTO score of at least  to assist 65 with reaching prior level of function. [] Progressing: [] Met: [] Not Met: [] Adjusted  2. Patient will demonstrate increased AROM to 0-120* left knee to allow for proper joint functioning as indicated by patients Functional Deficits. [] Progressing: [] Met: [] Not Met: [] Adjusted  3. Patient will demonstrate an increase in Strength to at least  5/5 left knee/hip as well as good proximal hip strength and control to allow for proper functional mobility as indicated by patients Functional Deficits. [] Progressing: [] Met: [] Not Met: [] Adjusted  4.  Patient will return to functional activities including ascending/descending steps reciprocally and demonstrating normal ambulation with no evidence of antalgia without increased symptoms or restriction. [] Progressing: [] Met: [] Not Met: [] Adjusted    Overall Progression Towards Functional goals/ Treatment Progress Update:  [] Patient is progressing as expected towards functional goals listed. [] Progression is slowed due to complexities/Impairments listed. [] Progression has been slowed due to co-morbidities. [x] Plan just implemented, too soon to assess goals progression <30days   [] Goals require adjustment due to lack of progress  [] Patient is not progressing as expected and requires additional follow up with physician  [] Other    Persisting Functional Limitations/Impairments:  []Sitting [x]Standing   [x]Walking []Squatting/bending    [x]Stairs [x]ADL's    []Transfers []Sleeping   [x]Housework []Job related tasks  []Driving []Sports/Recreation   []Other:    ASSESSMENT:  Pt presents with significant improvement in knee flexion ROM this date with PROM to 134 deg. She continues to require frequent cueing to maintain heel to toe gait and to avoid circumduction at the hip for step ups. She is able to tolerate above interventions including progressions seen in bold. Continue to progress ROM and gross strength as able to tolerate with transition to CKC/functional strengthening. Pt plans to travel out of state 1/15/22. Provide with updated HEP prior to travel.     Treatment/Activity Tolerance:  [x] Patient able to complete tx [] Patient limited by fatique  [] Patient limited by pain  [] Patient limited by other medical complications  [] Other:     Prognosis: [x] Good [] Fair  [] Poor    Patient Requires Follow-up: [x] Yes  [] No    Return to Play:    [x]  N/A   []  Stage 1: Intro to Strength   []  Stage 2: Return to Run and Strength   []  Stage 3: Return to Jump and Strength   []  Stage 4: Dynamic Strength and Agility   []  Stage 5: Sport Specific Training     []  Ready to Return to Play, Meets All Above Stages   []  Not Ready for Return to Sports   Comments: PLAN: See eval. PT 2x / wk for  8 weeks. [x] Continue per plan of care [] Alter current plan (see comments)  [] Plan of care initiated [] Hold pending MD visit [] Discharge    Electronically signed by: Nieves Brokc, PT DPT    Note: If patient does not return for scheduled/ recommended follow up visits, this note will serve as a discharge from care along with most recent update on progress.

## 2022-12-30 ENCOUNTER — HOSPITAL ENCOUNTER (OUTPATIENT)
Dept: PHYSICAL THERAPY | Age: 52
Setting detail: THERAPIES SERIES
End: 2022-12-30
Payer: MEDICAID

## 2022-12-30 NOTE — FLOWSHEET NOTE
[facility] [employer] [employer]     Physical Therapy  Cancellation/No-show Note  Patient Name:  Ramon Guerrero  :  1970   Date:  2022  Cancelled visits to date: 1  No-shows to date: 1    Patient status for today's appointment patient:  [x]  Cancelled    []  Rescheduled appointment  []  No-show      Reason given by patient:  [x]  Patient ill - Has stomach bug   []  Conflicting appointment  []  No transportation    []  Conflict with work  []  No reason given  []  Other:     Comments:      Phone call information:   []  Phone call made today to patient at 3:35 pm at number provided:      []  Patient answered, conversation as follows:    []  Patient did not answer, message left as follows:  Notified of missed appointment, and reminded pt about her next scheduled appointment on . Left our phone # to call us with any questions. []  Phone call not made today  [x]  Phone call not needed - pt contacted us to cancel and provided reason for cancellation.      Electronically signed by:  Aram Smith PT

## 2023-01-04 ENCOUNTER — HOSPITAL ENCOUNTER (OUTPATIENT)
Dept: PHYSICAL THERAPY | Age: 53
Setting detail: THERAPIES SERIES
Discharge: HOME | End: 2023-01-04

## 2023-01-04 NOTE — FLOWSHEET NOTE
[facility]     Physical Therapy  Cancellation/No-show Note  Patient Name:  Miladys Bolivar  :  1970   Date:  2023  Cancelled visits to date: 1  No-shows to date: 2    Patient status for today's appointment patient:  []  Cancelled    []  Rescheduled appointment  [x]  No-show ,      Reason given by patient:  []  Patient ill -[]  Conflicting appointment  []  No transportation    []  Conflict with work  [x]  No reason given  []  Other:     Comments:      Phone call information:   [x]  Phone call made today to patient at 2:35 pm at number provided:  [phone]     []  Patient answered, conversation as follows:    [x]  Patient did not answer, message left as follows:  Notified of missed appointment. Reminded of no show/cancel policy. Provided with time and date of next scheduled visit ( at 2:30pm)  []  Phone call not made today  []  Phone call not needed - pt contacted us to cancel and provided reason for cancellation.      Electronically signed by:  Patricia Stiles PT

## 2023-01-09 DIAGNOSIS — G25.81 RESTLESS LEG SYNDROME: ICD-10-CM

## 2023-01-09 RX ORDER — CLONAZEPAM 2 MG/1
2 TABLET ORAL 2 TIMES DAILY PRN
Qty: 60 TABLET | Refills: 0 | Status: SHIPPED | OUTPATIENT
Start: 2023-01-09 | End: 2023-02-08

## 2023-01-09 NOTE — TELEPHONE ENCOUNTER
Medication:   Requested Prescriptions     Pending Prescriptions Disp Refills    clonazePAM (KLONOPIN) 2 MG tablet 60 tablet 0     Sig: Take 1 tablet by mouth 2 times daily as needed (restless leg syndrome) for up to 30 days. Last Filled:  12/6/22    Patient Phone Number: [phone] (home)     Last appt: 10/24/2022   Next appt: Visit date not found    Last OARRS:   RX Monitoring 3/29/2019   Attestation The Prescription Monitoring Report for this patient was reviewed today.    Periodic Controlled Substance Monitoring -     PDMP Monitoring:    Last PDMP Francesca Antonio as Reviewed [facility]):  Review User Review Instant Review Result   Diane Baronting 8/4/2020  7:34 AM Reviewed PDMP [1]     Preferred Pharmacy:     [facility] [id number] [facility], [address] [phone] Roxanne Foster [phone]  [facility] [address]  [address] [place] [address]  Phone: [phone] Fax: [phone]

## 2023-01-09 NOTE — TELEPHONE ENCOUNTER
Pt called this morning for refill    clonazePAM (KLONOPIN) 2 MG tablet [[id number]]  ENDED    Order Details  Dose: 2 mg Route: Oral Frequency: 2 TIMES DAILY PRN for restless leg syndrome   Dispense Quantity: 60 tablet Refills: 0          Sig: Take 1 tablet by mouth 2 times daily as needed (restless leg syndrome) for up to 30 days.              [facility] [id number] [place] [address], [address] [place] [address]   Phone:  [phone]  Fax:  [phone]

## 2023-02-13 DIAGNOSIS — G25.81 RESTLESS LEG SYNDROME: ICD-10-CM

## 2023-02-13 RX ORDER — CLONAZEPAM 2 MG/1
TABLET ORAL
Qty: 60 TABLET | OUTPATIENT
Start: 2023-02-13

## 2023-02-13 RX ORDER — CLONAZEPAM 2 MG/1
2 TABLET ORAL 2 TIMES DAILY PRN
Qty: 60 TABLET | Refills: 0 | Status: SHIPPED | OUTPATIENT
Start: 2023-02-13 | End: 2023-03-15

## 2023-02-13 NOTE — TELEPHONE ENCOUNTER
Medication:   Requested Prescriptions     Pending Prescriptions Disp Refills    clonazePAM (KLONOPIN) 2 MG tablet [Pharmacy Med Name: CLONAZEPAM 2 MG TABLET] 60 tablet      Sig: TAKE ONE TABLET BY MOUTH TWICE A DAY AS NEEDED (RESTLESS LEG SYNDROME) FOR UP TP 30 DAYS      Last Filled:  1/9/2023    Patient Phone Number: [phone] (home)     Last appt: 10/24/2022   Next appt: Visit date not found    Last OARRS:   RX Monitoring 3/29/2019   Attestation The Prescription Monitoring Report for this patient was reviewed today.    Periodic Controlled Substance Monitoring -     PDMP Monitoring:    Last PDMP Hina Noyola as Reviewed [facility]):  Review User Review Instant Review Result    Tristin 8/4/2020  7:34 AM Reviewed PDMP [1]     Preferred Pharmacy:   Theresa Cruz [id number] [facility] 96 - P [phone] [facility] [phone]  [address] [place] [address]  Phone: [phone] Fax: [phone]    [address] [place], [place] [phone] - F [phone]  [facility] [address]  Phone: [phone] Fax: [phone]    Theresa Cruz [id number] - [address] [place] [address], [address] [place] [address] [phone] - F [phone]  [address] [address]  Phone: [phone] Fax: [phone]    [facility] [address] - [facility], [address] [place] [address] [phone] [facility] [phone]  [address]  [facility] [address]  Phone: [phone] Fax: [phone]  Medication:   Requested Prescriptions     Pending Prescriptions Disp Refills    clonazePAM (Vahid Potters) 2 MG tablet [Pharmacy Med Name: CLONAZEPAM 2 MG TABLET] 60 tablet      Sig: TAKE ONE TABLET BY MOUTH TWICE A DAY AS NEEDED (RESTLESS LEG SYNDROME) FOR UP TP 30 DAYS      Last Filled:    Patient Phone Number: [phone] (home)     Last appt: 10/24/2022   Next appt: Visit date not found    Last OARRS:   RX Monitoring 3/29/2019   Attestation The Prescription Monitoring Report for this patient was reviewed today.    Periodic Controlled Substance Monitoring -     PDMP Monitoring:    Last PDMP Nghia as Reviewed [facility]):  Review User Review Instant Review Result   Danyelle Friedman 8/4/2020  7:34 AM Reviewed PDMP [1]     Preferred Pharmacy:   [facility] [id number] [facility], [address] [phone] Remedios Postin [phone]  [address]  111 [facility] [address]  Phone: [phone] Fax: [phone] - Blayne Morales, [address] [phone] - F [phone]  [facility] [address]  Phone: [phone] Fax: [phone]    [facility] [id number] - CHARISMA GUZMAN, [address] [phone] - F [phone]  [facility] [address]  Phone: [phone] Fax: [phone]    [facility] [place] [address] - [facility], [address] - P [phone] Remedios Postin [phone]  [address]  111 [facility] [address]  Phone: [phone] Fax: [phone]

## 2023-02-13 NOTE — TELEPHONE ENCOUNTER
Dr Sammy Enciso denied her medication because she is due for an appt. Please advise pt to make an appt thanks.

## 2023-02-13 NOTE — TELEPHONE ENCOUNTER
clonazePAM (KLONOPIN) 2 MG tablet [[id number]]  ENDED    Order Details  Dose: 2 mg Route: Oral Frequency: 2 TIMES DAILY PRN for restless leg syndrome   Dispense Quantity: 60 tablet Refills: 0    Note to Pharmacy: DUE FOR APPT BEFORE NEXT REFILL         Sig: Take 1 tablet by mouth 2 times daily as needed (restless leg syndrome) for up to 30 days.    Soumya Longo [id number] [address], [address] [place] [address]   Phone:  [phone]  Fax:  [phone]

## 2023-03-28 ENCOUNTER — OFFICE VISIT (OUTPATIENT)
Dept: FAMILY MEDICINE CLINIC | Age: 53
End: 2023-03-28
Payer: MEDICAID

## 2023-03-28 VITALS
DIASTOLIC BLOOD PRESSURE: 80 MMHG | RESPIRATION RATE: 16 BRPM | OXYGEN SATURATION: 97 % | BODY MASS INDEX: 37.16 KG/M2 | HEART RATE: 78 BPM | SYSTOLIC BLOOD PRESSURE: 128 MMHG | WEIGHT: 216.5 LBS | TEMPERATURE: 97.8 F

## 2023-03-28 DIAGNOSIS — K21.9 GASTROESOPHAGEAL REFLUX DISEASE WITHOUT ESOPHAGITIS: ICD-10-CM

## 2023-03-28 DIAGNOSIS — Z23 NEED FOR PROPHYLACTIC VACCINATION AND INOCULATION AGAINST VARICELLA: ICD-10-CM

## 2023-03-28 DIAGNOSIS — F41.9 ANXIETY: ICD-10-CM

## 2023-03-28 DIAGNOSIS — G25.81 RESTLESS LEG SYNDROME: ICD-10-CM

## 2023-03-28 DIAGNOSIS — I87.2 VENOUS (PERIPHERAL) INSUFFICIENCY: ICD-10-CM

## 2023-03-28 DIAGNOSIS — Z12.31 ENCOUNTER FOR SCREENING MAMMOGRAM FOR BREAST CANCER: ICD-10-CM

## 2023-03-28 DIAGNOSIS — D50.8 IRON DEFICIENCY ANEMIA SECONDARY TO INADEQUATE DIETARY IRON INTAKE: ICD-10-CM

## 2023-03-28 DIAGNOSIS — Z00.00 WELL ADULT EXAM: Primary | ICD-10-CM

## 2023-03-28 PROCEDURE — 99396 PREV VISIT EST AGE 40-64: CPT | Performed by: FAMILY MEDICINE

## 2023-03-28 PROCEDURE — 99214 OFFICE O/P EST MOD 30 MIN: CPT | Performed by: FAMILY MEDICINE

## 2023-03-28 RX ORDER — CLONAZEPAM 2 MG/1
2 TABLET ORAL 2 TIMES DAILY PRN
Qty: 60 TABLET | Refills: 2 | Status: SHIPPED | OUTPATIENT
Start: 2023-03-28 | End: 2023-06-26

## 2023-03-28 RX ORDER — SERTRALINE HYDROCHLORIDE 100 MG/1
TABLET, FILM COATED ORAL
Qty: 180 TABLET | Refills: 1 | Status: SHIPPED | OUTPATIENT
Start: 2023-03-28

## 2023-03-28 RX ORDER — FERROUS SULFATE 325(65) MG
325 TABLET ORAL
Qty: 90 TABLET | Refills: 1 | Status: SHIPPED | OUTPATIENT
Start: 2023-03-28

## 2023-03-28 RX ORDER — OMEPRAZOLE 40 MG/1
CAPSULE, DELAYED RELEASE ORAL
Qty: 90 CAPSULE | Refills: 1 | Status: SHIPPED | OUTPATIENT
Start: 2023-03-28

## 2023-03-28 RX ORDER — BUPROPION HYDROCHLORIDE 75 MG/1
150 TABLET ORAL 2 TIMES DAILY
Qty: 360 TABLET | Refills: 0 | Status: CANCELLED | OUTPATIENT
Start: 2023-03-28

## 2023-03-28 RX ORDER — BUPROPION HYDROCHLORIDE 150 MG/1
150 TABLET, EXTENDED RELEASE ORAL 2 TIMES DAILY
Qty: 180 TABLET | Refills: 1 | Status: SHIPPED | OUTPATIENT
Start: 2023-03-28

## 2023-03-28 SDOH — ECONOMIC STABILITY: FOOD INSECURITY: WITHIN THE PAST 12 MONTHS, YOU WORRIED THAT YOUR FOOD WOULD RUN OUT BEFORE YOU GOT MONEY TO BUY MORE.: NEVER TRUE

## 2023-03-28 SDOH — ECONOMIC STABILITY: FOOD INSECURITY: WITHIN THE PAST 12 MONTHS, THE FOOD YOU BOUGHT JUST DIDN'T LAST AND YOU DIDN'T HAVE MONEY TO GET MORE.: NEVER TRUE

## 2023-03-28 SDOH — ECONOMIC STABILITY: HOUSING INSECURITY
IN THE LAST 12 MONTHS, WAS THERE A TIME WHEN YOU DID NOT HAVE A STEADY PLACE TO SLEEP OR SLEPT IN A SHELTER (INCLUDING NOW)?: NO

## 2023-03-28 SDOH — ECONOMIC STABILITY: INCOME INSECURITY: HOW HARD IS IT FOR YOU TO PAY FOR THE VERY BASICS LIKE FOOD, HOUSING, MEDICAL CARE, AND HEATING?: NOT HARD AT ALL

## 2023-03-28 ASSESSMENT — PATIENT HEALTH QUESTIONNAIRE - PHQ9
1. LITTLE INTEREST OR PLEASURE IN DOING THINGS: 0
SUM OF ALL RESPONSES TO PHQ QUESTIONS 1-9: 0
SUM OF ALL RESPONSES TO PHQ QUESTIONS 1-9: 0
10. IF YOU CHECKED OFF ANY PROBLEMS, HOW DIFFICULT HAVE THESE PROBLEMS MADE IT FOR YOU TO DO YOUR WORK, TAKE CARE OF THINGS AT HOME, OR GET ALONG WITH OTHER PEOPLE: 0
7. TROUBLE CONCENTRATING ON THINGS, SUCH AS READING THE NEWSPAPER OR WATCHING TELEVISION: 0
6. FEELING BAD ABOUT YOURSELF - OR THAT YOU ARE A FAILURE OR HAVE LET YOURSELF OR YOUR FAMILY DOWN: 0
3. TROUBLE FALLING OR STAYING ASLEEP: 0
8. MOVING OR SPEAKING SO SLOWLY THAT OTHER PEOPLE COULD HAVE NOTICED. OR THE OPPOSITE, BEING SO FIGETY OR RESTLESS THAT YOU HAVE BEEN MOVING AROUND A LOT MORE THAN USUAL: 0
9. THOUGHTS THAT YOU WOULD BE BETTER OFF DEAD, OR OF HURTING YOURSELF: 0
SUM OF ALL RESPONSES TO PHQ9 QUESTIONS 1 & 2: 0
SUM OF ALL RESPONSES TO PHQ QUESTIONS 1-9: 0
4. FEELING TIRED OR HAVING LITTLE ENERGY: 0
5. POOR APPETITE OR OVEREATING: 0
2. FEELING DOWN, DEPRESSED OR HOPELESS: 0
SUM OF ALL RESPONSES TO PHQ QUESTIONS 1-9: 0

## 2023-03-28 NOTE — PROGRESS NOTES
History and Physical      Tahira Vila  YOB: 1970    Date of Service:  3/28/2023    Chief Complaint:   Tahira Vila is a 45 y.o. female who presents for complete physical examination    HPI: Pt is here for a physical exam and will like to have her throat check due to having family members drying from cancer. Patient does feel she has acid reflux symptoms and has been to be having EGD with gastroenterologist in. She denies any respiratory symptoms associate with this. She has lost significant weight over the last several years with a self-induced diet. Patient dates a lot of stress recently where she lost her job and is looking for a new job. She has improved after her left knee replacement surgery. She is still taking iron pills this postoperatively she became anemic. Patient is very compliant with medications with no adverse reactions. Wt Readings from Last 3 Encounters:   03/28/23 216 lb 8 oz (98.2 kg)   10/24/22 242 lb (109.8 kg)   08/02/22 254 lb (115.2 kg)     BP Readings from Last 3 Encounters:   03/28/23 128/80   10/24/22 118/86   08/02/22 110/60       Patient Active Problem List   Diagnosis    Anxiety    Arthritis    Gastroesophageal reflux disease without esophagitis    Depression    Venous (peripheral) insufficiency    PMS (premenstrual syndrome)    Restless leg syndrome    Obesity due to excess calories    Monilial intertrigo    Iron deficiency anemia       Allergies   Allergen Reactions    Glucophage Xr [Metformin Hcl Er] Other (See Comments)     Gastrointestinal upset    Nitrofurantoin Macrocrystal Diarrhea     chills  chills    Penicillins      unknown    Sulfa Antibiotics Itching, Nausea Only and Rash     Outpatient Medications Marked as Taking for the 3/28/23 encounter (Office Visit) with Hunter Gallardo MD   Medication Sig Dispense Refill    clonazePAM (KLONOPIN) 2 MG tablet Take 1 tablet by mouth 2 times daily as needed (restless leg syndrome) for up to 30 days.  61

## 2023-03-31 ENCOUNTER — TELEPHONE (OUTPATIENT)
Dept: FAMILY MEDICINE CLINIC | Age: 53
End: 2023-03-31

## 2023-03-31 NOTE — TELEPHONE ENCOUNTER
Submitted PA for Tretinoin 0.025% cream  [facility]ia [facility] Key: [id number] STATUS: NOW PENDING    What is the diagnoses and ICD-10 code for this medication? Please respond to the pool ( P [facility] [address] [facility]). Thank you.

## 2023-04-05 NOTE — TELEPHONE ENCOUNTER
The medication is APPROVED 4/3/2023- 4/1/2024. If this requires a response please respond to the pool ( P [facility] [address]). Thank you please advise patient.

## 2023-06-05 ENCOUNTER — TELEPHONE (OUTPATIENT)
Dept: FAMILY MEDICINE CLINIC | Age: 53
End: 2023-06-05

## 2023-06-06 ENCOUNTER — OFFICE VISIT (OUTPATIENT)
Dept: FAMILY MEDICINE CLINIC | Age: 53
End: 2023-06-06
Payer: MEDICAID

## 2023-06-06 VITALS
SYSTOLIC BLOOD PRESSURE: 120 MMHG | OXYGEN SATURATION: 98 % | HEART RATE: 105 BPM | TEMPERATURE: 97.5 F | DIASTOLIC BLOOD PRESSURE: 86 MMHG

## 2023-06-06 DIAGNOSIS — M48.062 SPINAL STENOSIS OF LUMBAR REGION WITH NEUROGENIC CLAUDICATION: ICD-10-CM

## 2023-06-06 DIAGNOSIS — R29.898 WEAKNESS OF BACK: Primary | ICD-10-CM

## 2023-06-06 PROCEDURE — 99214 OFFICE O/P EST MOD 30 MIN: CPT | Performed by: FAMILY MEDICINE

## 2023-06-07 ENCOUNTER — TELEPHONE (OUTPATIENT)
Dept: FAMILY MEDICINE CLINIC | Age: 53
End: 2023-06-07

## 2023-06-07 DIAGNOSIS — R29.898 WEAKNESS OF BACK: ICD-10-CM

## 2023-06-07 DIAGNOSIS — M48.062 SPINAL STENOSIS OF LUMBAR REGION WITH NEUROGENIC CLAUDICATION: Primary | ICD-10-CM

## 2023-06-07 LAB
BASOPHILS ABSOLUTE: 0.1 X10E3/UL (ref 0–0.2)
BASOPHILS RELATIVE PERCENT: 1 %
BUN / CREAT RATIO: 18 (ref 9–23)
BUN BLDV-MCNC: 17 MG/DL (ref 6–24)
CALCIUM SERPL-MCNC: 9.8 MG/DL (ref 8.7–10.2)
CHLORIDE BLD-SCNC: 103 MMOL/L (ref 96–106)
CO2: 17 MMOL/L (ref 20–29)
CREAT SERPL-MCNC: 0.94 MG/DL (ref 0.57–1)
EOSINOPHILS ABSOLUTE: 0.1 X10E3/UL (ref 0–0.4)
EOSINOPHILS RELATIVE PERCENT: 1 %
ERYTHROCYTES, NUCLEATED/100 LEU: ABNORMAL
ESTIMATED GLOMERULAR FILTRATION RATE CREATININE EQUATION: 72 ML/MIN/1.73
GLUCOSE BLD-MCNC: 80 MG/DL (ref 70–99)
HCT VFR BLD CALC: 47.1 % (ref 34–46.6)
HEMOGLOBIN: 15.7 G/DL (ref 11.1–15.9)
IMMATURE CELLS ABSOLUTE COUNT: ABNORMAL
IMMATURE GRANS (ABS): 0 X10E3/UL (ref 0–0.1)
IMMATURE GRANULOCYTES: 0 %
LYMPHOCYTES ABSOLUTE: 2.1 X10E3/UL (ref 0.7–3.1)
LYMPHOCYTES RELATIVE PERCENT: 29 %
MCH RBC QN AUTO: 31.2 PG (ref 26.6–33)
MCHC RBC AUTO-ENTMCNC: 33.3 G/DL (ref 31.5–35.7)
MCV RBC AUTO: 94 FL (ref 79–97)
MONOCYTES ABSOLUTE: 0.4 X10E3/UL (ref 0.1–0.9)
MONOCYTES RELATIVE PERCENT: 6 %
MORPHOLOGY: ABNORMAL
NEUTROPHILS ABSOLUTE: 4.5 X10E3/UL (ref 1.4–7)
PDW BLD-RTO: 13.8 % (ref 11.7–15.4)
PLATELET # BLD: 210 X10E3/UL (ref 150–450)
POTASSIUM SERPL-SCNC: 5.4 MMOL/L (ref 3.5–5.2)
RBC # BLD: 5.03 X10E6/UL (ref 3.77–5.28)
SEGMENTED NEUTROPHILS RELATIVE PERCENT: 63 %
SODIUM BLD-SCNC: 139 MMOL/L (ref 134–144)
WBC # BLD: 7.2 X10E3/UL (ref 3.4–10.8)

## 2023-06-07 NOTE — TELEPHONE ENCOUNTER
----- Message from Jennifer Maxwell MD sent at 6/7/2023  7:17 AM EDT -----  Her lab work is normal I would recommend proceeding with MRI scan of the lumbar spine without contrast

## 2023-06-22 ENCOUNTER — TELEPHONE (OUTPATIENT)
Dept: FAMILY MEDICINE CLINIC | Age: 53
End: 2023-06-22

## 2023-06-22 NOTE — TELEPHONE ENCOUNTER
Pt requesting more prednisone for spinal stenosis.      (I could not find this in pt's chart)     Please advise       [facility] [id number] Fidel Bradley

## 2023-06-23 ENCOUNTER — TELEPHONE (OUTPATIENT)
Dept: FAMILY MEDICINE CLINIC | Age: 53
End: 2023-06-23

## 2023-06-23 RX ORDER — PREDNISONE 20 MG/1
TABLET ORAL
Qty: 12 TABLET | Refills: 0 | Status: SHIPPED | OUTPATIENT
Start: 2023-06-23

## 2023-06-23 NOTE — TELEPHONE ENCOUNTER
Patient has never received prednisone from our office? She does have a prescription for ibuprofen 800 mg I would recommend she use the ibuprofen medication. She may need a refill.

## 2023-06-30 RX ORDER — IBUPROFEN 800 MG/1
800 TABLET ORAL 3 TIMES DAILY
Qty: 270 TABLET | Refills: 0 | Status: SHIPPED | OUTPATIENT
Start: 2023-06-30

## 2023-07-07 ENCOUNTER — HOSPITAL ENCOUNTER (OUTPATIENT)
Dept: MRI IMAGING | Age: 53
Discharge: HOME | End: 2023-07-07
Attending: FAMILY MEDICINE
Payer: MEDICAID

## 2023-07-07 DIAGNOSIS — R29.898 WEAKNESS OF BACK: ICD-10-CM

## 2023-07-07 DIAGNOSIS — M48.062 SPINAL STENOSIS OF LUMBAR REGION WITH NEUROGENIC CLAUDICATION: ICD-10-CM

## 2023-07-07 PROCEDURE — 72148 MRI LUMBAR SPINE W/O DYE: CPT

## 2023-07-10 ENCOUNTER — TELEPHONE (OUTPATIENT)
Dept: FAMILY MEDICINE CLINIC | Age: 53
End: 2023-07-10

## 2023-07-10 NOTE — TELEPHONE ENCOUNTER
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 States she needs a prescription for noise cancelling headphones. Please advise.

## 2023-07-10 NOTE — TELEPHONE ENCOUNTER
Pt returned call. States she is aware that it will not be covered by insurance. Will  paper rx here at the office. Please call her when ready. [phone] is best number to reach her.

## 2023-07-10 NOTE — TELEPHONE ENCOUNTER
Not sure if these are really covered per insurance plan? ?  We can give her a prescription for them but??

## 2023-07-14 ENCOUNTER — TELEPHONE (OUTPATIENT)
Dept: FAMILY MEDICINE CLINIC | Age: 53
End: 2023-07-14

## 2023-07-14 DIAGNOSIS — M48.062 SPINAL STENOSIS OF LUMBAR REGION WITH NEUROGENIC CLAUDICATION: Primary | ICD-10-CM

## 2023-07-14 NOTE — TELEPHONE ENCOUNTER
----- Message from Yao Munoz MD sent at 7/14/2023  7:19 AM EDT -----  MRI scan demonstrates arthritis in the lower 3 levels with stenosis, which means pinching of the spinal cord.   Would recommend consultation with Dr. Chloe Edwards neurosurgeon

## 2023-07-24 ENCOUNTER — TELEPHONE (OUTPATIENT)
Dept: FAMILY MEDICINE CLINIC | Age: 53
End: 2023-07-24

## 2023-07-24 NOTE — TELEPHONE ENCOUNTER
Unfortunately the neurosurgeon that was at [facility] has retired.   She can ask for someone else in the practice

## 2023-07-24 NOTE — TELEPHONE ENCOUNTER
Patient called and stated that she was referred to Dr. Shanice Alegre but he does not fit into her schedule. She is off on fridays but he is open on fridays according to patient. She was wondering if Dr. Carolann Pineda could refer her to someone at [facility].      Call back number: [phone]    Please Advise

## 2023-08-01 ENCOUNTER — OFFICE VISIT (OUTPATIENT)
Dept: FAMILY MEDICINE CLINIC | Age: 53
End: 2023-08-01
Payer: MEDICAID

## 2023-08-01 VITALS
TEMPERATURE: 97.7 F | RESPIRATION RATE: 12 BRPM | HEART RATE: 79 BPM | BODY MASS INDEX: 36.09 KG/M2 | WEIGHT: 210.25 LBS | DIASTOLIC BLOOD PRESSURE: 78 MMHG | OXYGEN SATURATION: 97 % | SYSTOLIC BLOOD PRESSURE: 130 MMHG

## 2023-08-01 DIAGNOSIS — G25.81 RESTLESS LEG SYNDROME: ICD-10-CM

## 2023-08-01 DIAGNOSIS — K21.9 GASTROESOPHAGEAL REFLUX DISEASE WITHOUT ESOPHAGITIS: ICD-10-CM

## 2023-08-01 DIAGNOSIS — M48.062 SPINAL STENOSIS OF LUMBAR REGION WITH NEUROGENIC CLAUDICATION: Primary | ICD-10-CM

## 2023-08-01 PROCEDURE — 99214 OFFICE O/P EST MOD 30 MIN: CPT | Performed by: FAMILY MEDICINE

## 2023-08-01 RX ORDER — FERROUS SULFATE 325(65) MG
325 TABLET ORAL
Qty: 90 TABLET | Refills: 1 | Status: SHIPPED | OUTPATIENT
Start: 2023-08-01

## 2023-08-01 RX ORDER — CLONAZEPAM 2 MG/1
2 TABLET ORAL 2 TIMES DAILY PRN
Qty: 60 TABLET | Refills: 2 | Status: SHIPPED | OUTPATIENT
Start: 2023-08-01 | End: 2023-10-30

## 2023-08-01 RX ORDER — BUPROPION HYDROCHLORIDE 150 MG/1
150 TABLET, EXTENDED RELEASE ORAL 2 TIMES DAILY
Qty: 180 TABLET | Refills: 1 | Status: SHIPPED | OUTPATIENT
Start: 2023-08-01

## 2023-08-01 RX ORDER — OMEPRAZOLE 40 MG/1
CAPSULE, DELAYED RELEASE ORAL
Qty: 90 CAPSULE | Refills: 1 | Status: SHIPPED | OUTPATIENT
Start: 2023-08-01

## 2023-08-01 RX ORDER — SERTRALINE HYDROCHLORIDE 100 MG/1
TABLET, FILM COATED ORAL
Qty: 180 TABLET | Refills: 1 | Status: SHIPPED | OUTPATIENT
Start: 2023-08-01

## 2023-08-01 NOTE — PROGRESS NOTES
Neurological:      Mental Status: She is alert and oriented to person, place, and time. Sensory: Sensation is intact. Motor: Motor function is intact. Deep Tendon Reflexes:      Reflex Scores:       Patellar reflexes are 2+ on the right side and 2+ on the left side. Achilles reflexes are 2+ on the right side and 2+ on the left side. Psychiatric:         Attention and Perception: Attention and perception normal.         Mood and Affect: Mood and affect normal.         Speech: Speech normal.         Behavior: Behavior normal. Behavior is cooperative. Thought Content: Thought content normal.         Cognition and Memory: Cognition and memory normal.         Judgment: Judgment normal.       Assessment:      Marilee was seen today for follow-up, anxiety and depression. Diagnoses and all orders for this visit:    Spinal stenosis of lumbar region with neurogenic claudication    Restless leg syndrome  -     clonazePAM (KLONOPIN) 2 MG tablet; Take 1 tablet by mouth 2 times daily as needed (restless leg syndrome) for up to 90 days. Max Daily Amount: 4 mg    Gastroesophageal reflux disease without esophagitis  -     omeprazole (PRILOSEC) 40 MG delayed release capsule; TAKE ONE CAPSULE BY MOUTH DAILY    Other orders  -     sertraline (ZOLOFT) 100 MG tablet; TAKE 2 TABLETS DAILY  -     ferrous sulfate (IRON 325) 325 (65 Fe) MG tablet; Take 1 tablet by mouth daily (with breakfast)  -     buPROPion (ZYBAN) 150 MG extended release tablet; Take 1 tablet by mouth 2 times daily    OARRS report checked          Plan:      Reviewed labs and test results with patient. Patient to keep appoint with neurosurgeon to discuss possible surgical intervention. Maintain clonazepam on a as needed basis. No change of anxiety medications.     Patient received counseling on the following healthy behaviors: nutrition and exercise     Patient given educational materials     Health maintenance

## 2023-08-12 DIAGNOSIS — G25.81 RESTLESS LEG SYNDROME: ICD-10-CM

## 2023-08-14 RX ORDER — CLONAZEPAM 2 MG/1
TABLET ORAL
Qty: 60 TABLET | Refills: 0 | OUTPATIENT
Start: 2023-08-14 | End: 2023-09-13

## 2023-08-14 NOTE — TELEPHONE ENCOUNTER
Medication:   Requested Prescriptions     Pending Prescriptions Disp Refills    clonazePAM (KLONOPIN) 2 MG tablet [Pharmacy Med Name: clonazePAM 2 MG TABLET] 60 tablet 0     Sig: TAKE ONE TABLET BY MOUTH TWO TIMES A DAY FOR UP TO 90 DAYS. MAX DAILY AMOUNT: 4MG      Last Filled:      Patient Phone Number: [phone] (home)     Last appt: 8/1/2023   Next appt: 12/4/2023    Last OARRS:   RX Monitoring 3/29/2019   Attestation The Prescription Monitoring Report for this patient was reviewed today.    Periodic Controlled Substance Monitoring -     PDMP Monitoring:    Last PDMP Huy Kong as Reviewed [facility]):  Review User Review Instant Review Result   Bere Hutchison 3/28/2023  1:21 PM Reviewed PDMP [1]     Preferred Pharmacy:   [facility] [id number] [address] [phone] [facility] [phone]  [address] [place] [address]  Phone: [phone] Fax: [phone] - [address] [phone] - F [phone]  [address] [place] [address],[address]  [facility] [address]  Phone: [phone] Fax: [phone]    [facility] [id number] - JXBJFZI EUCHHQLA, 1 [facility] [phone] - F [phone]  [address] [place] [address]  Phone: [phone] Fax: [phone]    [facility] [address] - [address] [facility] [place], [facility] [facility] [phone] - F [phone] [address] [address] Kathrine Manzo [address]  Phone: [phone] Fax: [phone]

## 2023-08-25 ENCOUNTER — HOSPITAL ENCOUNTER (OUTPATIENT)
Dept: PHYSICAL THERAPY | Age: 53
Setting detail: THERAPIES SERIES
Discharge: HOME | End: 2023-08-25

## 2023-08-25 NOTE — PROGRESS NOTES
[address]     Physical Therapy  Cancellation/No-show Note  Patient Name:  Thu Navarrete  :  1970   Date:  2023  Cancelled visits to date: 0  No-shows to date: 1    Patient status for today's appointment patient:  []  Cancelled  []  Rescheduled appointment  [x]  No-show  eval     Reason given by patient:  []  Patient ill  []  Conflicting appointment  []  No transportation    []  Conflict with work  [x]  No reason given  []  Other:     Comments:      Phone call information:   []  Phone call made today to patient at _ time at number provided:      []  Patient answered, conversation as follows:    []  Patient did not answer, message left as follows:  [x]  Phone call not made today  []  Phone call not needed - pt contacted us to cancel and provided reason for cancellation. Electronically signed by:   Magy Johnston PT DPT ATC

## 2023-12-04 ENCOUNTER — OFFICE VISIT (OUTPATIENT)
Dept: FAMILY MEDICINE CLINIC | Age: 53
End: 2023-12-04
Payer: MEDICAID

## 2023-12-04 VITALS
RESPIRATION RATE: 16 BRPM | HEART RATE: 79 BPM | BODY MASS INDEX: 34.72 KG/M2 | WEIGHT: 202.25 LBS | SYSTOLIC BLOOD PRESSURE: 140 MMHG | DIASTOLIC BLOOD PRESSURE: 88 MMHG | OXYGEN SATURATION: 98 % | TEMPERATURE: 97.1 F

## 2023-12-04 DIAGNOSIS — Z23 NEED FOR INFLUENZA VACCINATION: ICD-10-CM

## 2023-12-04 DIAGNOSIS — K21.9 GASTROESOPHAGEAL REFLUX DISEASE WITHOUT ESOPHAGITIS: ICD-10-CM

## 2023-12-04 DIAGNOSIS — F33.1 MODERATE EPISODE OF RECURRENT MAJOR DEPRESSIVE DISORDER (HCC): Primary | ICD-10-CM

## 2023-12-04 DIAGNOSIS — D50.8 IRON DEFICIENCY ANEMIA SECONDARY TO INADEQUATE DIETARY IRON INTAKE: ICD-10-CM

## 2023-12-04 DIAGNOSIS — G25.81 RESTLESS LEG SYNDROME: ICD-10-CM

## 2023-12-04 PROCEDURE — 90471 IMMUNIZATION ADMIN: CPT | Performed by: FAMILY MEDICINE

## 2023-12-04 PROCEDURE — 90674 CCIIV4 VAC NO PRSV 0.5 ML IM: CPT | Performed by: FAMILY MEDICINE

## 2023-12-04 PROCEDURE — 99214 OFFICE O/P EST MOD 30 MIN: CPT | Performed by: FAMILY MEDICINE

## 2023-12-04 RX ORDER — IBUPROFEN 800 MG/1
800 TABLET ORAL EVERY 8 HOURS PRN
Qty: 270 TABLET | Refills: 0 | Status: SHIPPED | OUTPATIENT
Start: 2023-12-04

## 2023-12-04 RX ORDER — CLONAZEPAM 2 MG/1
2 TABLET ORAL 2 TIMES DAILY PRN
Qty: 60 TABLET | Refills: 2 | Status: SHIPPED | OUTPATIENT
Start: 2023-12-04 | End: 2024-03-03

## 2023-12-04 RX ORDER — OMEPRAZOLE 40 MG/1
CAPSULE, DELAYED RELEASE ORAL
Qty: 90 CAPSULE | Refills: 1 | Status: SHIPPED | OUTPATIENT
Start: 2023-12-04

## 2023-12-04 NOTE — PROGRESS NOTES
Vaccine Information Sheet, \"Influenza - Inactivated\"  given to Summer Vasques, or parent/legal guardian of  Summer Vasques and verbalized understanding. Patient responses:    Have you ever had a reaction to a flu vaccine? No  Are you able to eat eggs without adverse effects? Yes  Do you have any current illness? No  Have you ever had Guillian Orlando Syndrome? No    Flu vaccine given per order. Please see immunization tab.     Immunization(s) given during visit:     Immunizations Administered       Name Date Dose Route    Influenza, FLUCELVAX, (age 10 mo+), MDCK, PF, 0.5mL 12/4/2023 0.5 mL Intramuscular    Site: Deltoid- Right    Lot: [id number]    [address]: [id number]
person, place, and time. Psychiatric:         Attention and Perception: Attention and perception normal.         Mood and Affect: Mood and affect normal.         Speech: Speech normal.         Behavior: Behavior normal. Behavior is cooperative. Thought Content: Thought content normal.         Cognition and Memory: Cognition and memory normal.         Judgment: Judgment normal.         Assessment:      Marilee was seen today for follow-up. Diagnoses and all orders for this visit:    Moderate episode of recurrent major depressive disorder ([address])    Need for influenza vaccination  -     Influenza, FLUCELVAX, (age 10 mo+), IM, Preservative Free, 0.5 mL    Restless leg syndrome  -     clonazePAM (KLONOPIN) 2 MG tablet; Take 1 tablet by mouth 2 times daily as needed (restless leg syndrome) for up to 90 days. Max Daily Amount: 4 mg    Gastroesophageal reflux disease without esophagitis  -     omeprazole (PRILOSEC) 40 MG delayed release capsule; TAKE ONE CAPSULE BY MOUTH DAILY    Iron deficiency anemia secondary to inadequate dietary iron intake  -     CBC; Future  -     Iron and TIBC; Future    Other orders  -     ibuprofen (ADVIL;MOTRIN) 800 MG tablet; Take 1 tablet by mouth every 8 hours as needed    OARRS report checked          Plan:      Recommend patient continue with current medications and proceed with laboratory testing for iron deficiency anemia    The chest wall and right shoulder pain is clinic consistent with muscular skeletal and recommend patient start using ibuprofen more regularly. We discussed watching that area for rash consistent with shingles    For the GERD recommended patient avoid acidic foods and laying down at nighttime after eating. RTC 3 months    Please note that this chart was generated using Dragon dictation software. Although every effort was made to ensure the accuracy of this automated transcription, some errors in transcription may have occurred.

## 2023-12-05 LAB
BASOPHILS ABSOLUTE: 0.1 X10E3/UL (ref 0–0.2)
BASOPHILS RELATIVE PERCENT: 1 %
EOSINOPHILS ABSOLUTE: 0.2 X10E3/UL (ref 0–0.4)
EOSINOPHILS RELATIVE PERCENT: 3 %
ERYTHROCYTES, NUCLEATED/100 LEU: ABNORMAL
HCT VFR BLD CALC: 42.5 % (ref 34–46.6)
HEMOGLOBIN: 14.1 G/DL (ref 11.1–15.9)
IMMATURE CELLS ABSOLUTE COUNT: ABNORMAL
IMMATURE GRANS (ABS): 0 X10E3/UL (ref 0–0.1)
IMMATURE GRANULOCYTES: 0 %
IRON SATURATION: 33 % (ref 15–55)
IRON: 93 UG/DL (ref 27–159)
LYMPHOCYTES ABSOLUTE: 2.2 X10E3/UL (ref 0.7–3.1)
LYMPHOCYTES RELATIVE PERCENT: 36 %
MCH RBC QN AUTO: 33.7 PG (ref 26.6–33)
MCHC RBC AUTO-ENTMCNC: 33.2 G/DL (ref 31.5–35.7)
MCV RBC AUTO: 101 FL (ref 79–97)
MONOCYTES ABSOLUTE: 0.5 X10E3/UL (ref 0.1–0.9)
MONOCYTES RELATIVE PERCENT: 8 %
MORPHOLOGY: ABNORMAL
NEUTROPHILS ABSOLUTE: 3.2 X10E3/UL (ref 1.4–7)
PDW BLD-RTO: 13.6 % (ref 11.7–15.4)
PLATELET # BLD: 187 X10E3/UL (ref 150–450)
RBC # BLD: 4.19 X10E6/UL (ref 3.77–5.28)
SEGMENTED NEUTROPHILS RELATIVE PERCENT: 52 %
TOTAL IRON BINDING CAPACITY: 285 UG/DL (ref 250–450)
UNSATURATED IRON BINDING CAPACITY: 192 UG/DL (ref 131–425)
WBC # BLD: 6.2 X10E3/UL (ref 3.4–10.8)

## 2024-01-18 ENCOUNTER — OFFICE VISIT (OUTPATIENT)
Dept: FAMILY MEDICINE CLINIC | Age: 54
End: 2024-01-18
Payer: MEDICAID

## 2024-01-18 VITALS
OXYGEN SATURATION: 98 % | SYSTOLIC BLOOD PRESSURE: 108 MMHG | RESPIRATION RATE: 16 BRPM | WEIGHT: 202.5 LBS | BODY MASS INDEX: 34.76 KG/M2 | HEART RATE: 70 BPM | TEMPERATURE: 97.3 F | DIASTOLIC BLOOD PRESSURE: 70 MMHG

## 2024-01-18 DIAGNOSIS — M79.2 NEURITIS: Primary | ICD-10-CM

## 2024-01-18 DIAGNOSIS — F32.9 REACTIVE DEPRESSION: ICD-10-CM

## 2024-01-18 PROCEDURE — 99214 OFFICE O/P EST MOD 30 MIN: CPT | Performed by: FAMILY MEDICINE

## 2024-01-18 RX ORDER — PREDNISONE 20 MG/1
TABLET ORAL
Qty: 15 TABLET | Refills: 0 | Status: SHIPPED | OUTPATIENT
Start: 2024-01-18

## 2024-01-18 NOTE — PROGRESS NOTES
I am requesting that I refill my zolpidem. I currently have 4 tablets left from my last refill on Dec. 2nd. This will last me another week, and I am doing better at not needing them. I am only using them about 2 x a week. Requested Prescriptions     Pending Prescriptions Disp Refills    zolpidem CR (AMBIEN CR) 12.5 mg tablet 15 Tablet 0     Sig: Take at night PRN for sleep         [facility] [address],  Kristina Mckeonb[place] [address]  Phone: [phone] Fax: [phone]       8/15/2022 is LAST OFFICE VISIT     No future appointments.
visit:    Neuritis    Reactive depression    Other orders  -     predniSONE (DELTASONE) 20 MG tablet; 1 TID for 3 day then 1 BID            Plan:      Recommend patient reach out for counseling services in regards to her reactive depression related to the relationship between her and her parents.  Patient agrees with this plan of care    In regards to the neuritis recommend a prednisone burst and continue to watch for signs of rash and if the rash would develop patient is to call back for antiviral treatments    RTC at regular appointment time    Please note that this chart was generated using Dragon dictation software. Although every effort was made to ensure the accuracy of this automated transcription, some errors in transcription may have occurred.

## 2024-02-14 RX ORDER — SERTRALINE HYDROCHLORIDE 100 MG/1
TABLET, FILM COATED ORAL
Qty: 180 TABLET | Refills: 1 | Status: SHIPPED | OUTPATIENT
Start: 2024-02-14

## 2024-03-04 ENCOUNTER — OFFICE VISIT (OUTPATIENT)
Dept: FAMILY MEDICINE CLINIC | Age: 54
End: 2024-03-04
Payer: MEDICAID

## 2024-03-04 VITALS
TEMPERATURE: 97.7 F | WEIGHT: 204.38 LBS | HEART RATE: 78 BPM | SYSTOLIC BLOOD PRESSURE: 130 MMHG | RESPIRATION RATE: 12 BRPM | DIASTOLIC BLOOD PRESSURE: 80 MMHG | OXYGEN SATURATION: 98 % | BODY MASS INDEX: 35.08 KG/M2

## 2024-03-04 DIAGNOSIS — F32.1 CURRENT MODERATE EPISODE OF MAJOR DEPRESSIVE DISORDER WITHOUT PRIOR EPISODE (HCC): Primary | ICD-10-CM

## 2024-03-04 DIAGNOSIS — K21.9 GASTROESOPHAGEAL REFLUX DISEASE WITHOUT ESOPHAGITIS: ICD-10-CM

## 2024-03-04 DIAGNOSIS — G25.81 RESTLESS LEG SYNDROME: ICD-10-CM

## 2024-03-04 PROCEDURE — 99214 OFFICE O/P EST MOD 30 MIN: CPT | Performed by: FAMILY MEDICINE

## 2024-03-04 RX ORDER — BUPROPION HYDROCHLORIDE 150 MG/1
150 TABLET, FILM COATED, EXTENDED RELEASE ORAL DAILY
Qty: 7 TABLET | Refills: 0 | Status: SHIPPED | OUTPATIENT
Start: 2024-03-04 | End: 2024-03-11

## 2024-03-04 RX ORDER — OMEPRAZOLE 40 MG/1
CAPSULE, DELAYED RELEASE ORAL
Qty: 90 CAPSULE | Refills: 1 | Status: SHIPPED | OUTPATIENT
Start: 2024-03-04

## 2024-03-04 RX ORDER — CLONAZEPAM 2 MG/1
2 TABLET ORAL 2 TIMES DAILY PRN
Qty: 60 TABLET | Refills: 2 | Status: SHIPPED | OUTPATIENT
Start: 2024-03-04 | End: 2024-06-02

## 2024-03-04 RX ORDER — BUPROPION HYDROCHLORIDE 150 MG/1
150 TABLET, FILM COATED, EXTENDED RELEASE ORAL 2 TIMES DAILY
Qty: 180 TABLET | Refills: 1 | Status: CANCELLED | OUTPATIENT
Start: 2024-03-04

## 2024-03-04 RX ORDER — MIRTAZAPINE 15 MG/1
15 TABLET, FILM COATED ORAL NIGHTLY
Qty: 90 TABLET | Refills: 1 | Status: SHIPPED | OUTPATIENT
Start: 2024-03-04

## 2024-03-04 ASSESSMENT — PATIENT HEALTH QUESTIONNAIRE - PHQ9
1. LITTLE INTEREST OR PLEASURE IN DOING THINGS: 1
7. TROUBLE CONCENTRATING ON THINGS, SUCH AS READING THE NEWSPAPER OR WATCHING TELEVISION: 1
6. FEELING BAD ABOUT YOURSELF - OR THAT YOU ARE A FAILURE OR HAVE LET YOURSELF OR YOUR FAMILY DOWN: 1
SUM OF ALL RESPONSES TO PHQ QUESTIONS 1-9: 9
10. IF YOU CHECKED OFF ANY PROBLEMS, HOW DIFFICULT HAVE THESE PROBLEMS MADE IT FOR YOU TO DO YOUR WORK, TAKE CARE OF THINGS AT HOME, OR GET ALONG WITH OTHER PEOPLE: 1
SUM OF ALL RESPONSES TO PHQ QUESTIONS 1-9: 9
SUM OF ALL RESPONSES TO PHQ QUESTIONS 1-9: 9
4. FEELING TIRED OR HAVING LITTLE ENERGY: 1
9. THOUGHTS THAT YOU WOULD BE BETTER OFF DEAD, OR OF HURTING YOURSELF: 1
5. POOR APPETITE OR OVEREATING: 1
8. MOVING OR SPEAKING SO SLOWLY THAT OTHER PEOPLE COULD HAVE NOTICED. OR THE OPPOSITE, BEING SO FIGETY OR RESTLESS THAT YOU HAVE BEEN MOVING AROUND A LOT MORE THAN USUAL: 1
2. FEELING DOWN, DEPRESSED OR HOPELESS: 1
3. TROUBLE FALLING OR STAYING ASLEEP: 1
SUM OF ALL RESPONSES TO PHQ QUESTIONS 1-9: 8
SUM OF ALL RESPONSES TO PHQ9 QUESTIONS 1 & 2: 2

## 2024-03-04 ASSESSMENT — COLUMBIA-SUICIDE SEVERITY RATING SCALE - C-SSRS
2. HAVE YOU ACTUALLY HAD ANY THOUGHTS OF KILLING YOURSELF?: NO
6. HAVE YOU EVER DONE ANYTHING, STARTED TO DO ANYTHING, OR PREPARED TO DO ANYTHING TO END YOUR LIFE?: NO
1. WITHIN THE PAST MONTH, HAVE YOU WISHED YOU WERE DEAD OR WISHED YOU COULD GO TO SLEEP AND NOT WAKE UP?: NO

## 2024-03-04 NOTE — PROGRESS NOTES
buPROPion (ZYBAN) 150 MG extended release tablet Take 1 tablet by mouth 2 times daily 180 tablet 1    tretinoin (RETIN-A) 0.025 % cream Apply topically nightly. 20 g 2       Allergies   Allergen Reactions    Glucophage Xr [Metformin Hcl Er] Other (See Comments)     Gastrointestinal upset    Nitrofurantoin Macrocrystal Diarrhea     chills  chills    Penicillins      unknown    Sulfa Antibiotics Itching, Nausea Only and Rash       Social History     Tobacco Use    Smoking status: Former     Current packs/day: 0.00     Average packs/day: 1 pack/day for 28.2 years (28.2 ttl pk-yrs)     Types: Cigarettes     Start date: 1984     Quit date: 3/28/2012     Years since quittin.9    Smokeless tobacco: Never   Substance Use Topics    Alcohol use: No       /80 (Site: Right Upper Arm, Position: Sitting, Cuff Size: Large Adult)   Pulse 78   Temp 97.7 °F (36.5 °C) (Infrared)   Resp 12   Wt 92.7 kg (204 lb 6 oz)   SpO2 98%   BMI 35.08 kg/m²      Objective:   Physical Exam  Vitals and nursing note reviewed.   Constitutional:       General: She is not in acute distress.     Appearance: Normal appearance. She is well-developed and well-groomed.   Neurological:      Mental Status: She is alert and oriented to person, place, and time.   Psychiatric:         Attention and Perception: Attention and perception normal.         Mood and Affect: Mood is anxious and depressed. Affect is tearful.         Speech: Speech normal.         Behavior: Behavior normal. Behavior is cooperative.         Thought Content: Thought content normal.         Cognition and Memory: Cognition and memory normal.         Judgment: Judgment normal.         Assessment:      Marilee was seen today for follow-up, anxiety and depression.    Diagnoses and all orders for this visit:    Current moderate episode of major depressive disorder without prior episode (HCC)    Restless leg syndrome  -     clonazePAM (KLONOPIN) 2 MG tablet; Take 1 tablet by

## 2024-03-26 ENCOUNTER — OFFICE VISIT (OUTPATIENT)
Dept: FAMILY MEDICINE CLINIC | Age: 54
End: 2024-03-26
Payer: MEDICAID

## 2024-03-26 VITALS — OXYGEN SATURATION: 96 % | HEART RATE: 113 BPM | TEMPERATURE: 97.4 F

## 2024-03-26 DIAGNOSIS — J06.9 VIRAL URI: Primary | ICD-10-CM

## 2024-03-26 PROCEDURE — 99213 OFFICE O/P EST LOW 20 MIN: CPT | Performed by: NURSE PRACTITIONER

## 2024-03-26 NOTE — PROGRESS NOTES
Breath sounds: Normal breath sounds. No wheezing or rhonchi.   Skin:     General: Skin is warm and dry.   Neurological:      Mental Status: She is alert and oriented to person, place, and time.   Psychiatric:         Mood and Affect: Mood normal.         Behavior: Behavior normal.         Thought Content: Thought content normal.         Judgment: Judgment normal.         Assessment/Plan    1. Viral URI  Suggested OTC mucinex  hydration      No follow-ups on file.    This dictation was generated by voice recognition computer software.  Although all attempts are made to edit the dictation for accuracy, there may be errors in the transcription that are not intended.

## 2024-05-14 ENCOUNTER — TELEPHONE (OUTPATIENT)
Dept: FAMILY MEDICINE CLINIC | Age: 54
End: 2024-05-14

## 2024-05-14 NOTE — TELEPHONE ENCOUNTER
----- Message from Violette Bergeron sent at 5/9/2024  4:39 PM EDT -----  Regarding: FW: ECC Escalation To Practice    ----- Message -----  From: Jarek Ortiz  Sent: 5/9/2024   4:36 PM EDT  To: [facility] Clinical Staff  Subject: ECC Escalation To Practice                       ECC Escalation To Practice      Type of Escalation: Red Flag Symptom  --------------------------------------------------------------------------------------------------------------------------    Information for Provider:  Patient is looking for appointment for: Symptom : Both hand are swelling and numb at night for a week already.  Reasons for Message: Unable to reach practice     Additional Information: Her hand is tingling and stiff also swollen and numb at night  --------------------------------------------------------------------------------------------------------------------------    Relationship to Patient: Self     Call Back Info: OK to leave message on voicemail  Preferred Call Back Number: Phone [phone] (home)

## 2024-05-20 ENCOUNTER — OFFICE VISIT (OUTPATIENT)
Dept: FAMILY MEDICINE CLINIC | Age: 54
End: 2024-05-20
Payer: MEDICAID

## 2024-05-20 VITALS
WEIGHT: 211 LBS | HEART RATE: 102 BPM | SYSTOLIC BLOOD PRESSURE: 134 MMHG | TEMPERATURE: 97 F | BODY MASS INDEX: 36.02 KG/M2 | HEIGHT: 64 IN | DIASTOLIC BLOOD PRESSURE: 84 MMHG | OXYGEN SATURATION: 96 %

## 2024-05-20 DIAGNOSIS — G56.03 BILATERAL CARPAL TUNNEL SYNDROME: Primary | ICD-10-CM

## 2024-05-20 DIAGNOSIS — F32.1 CURRENT MODERATE EPISODE OF MAJOR DEPRESSIVE DISORDER WITHOUT PRIOR EPISODE (HCC): ICD-10-CM

## 2024-05-20 DIAGNOSIS — F41.9 ANXIETY: ICD-10-CM

## 2024-05-20 PROCEDURE — 99214 OFFICE O/P EST MOD 30 MIN: CPT | Performed by: FAMILY MEDICINE

## 2024-05-20 RX ORDER — MIRTAZAPINE 30 MG/1
30 TABLET, FILM COATED ORAL NIGHTLY
Qty: 90 TABLET | Refills: 1 | Status: SHIPPED | OUTPATIENT
Start: 2024-05-20

## 2024-05-20 SDOH — ECONOMIC STABILITY: FOOD INSECURITY: WITHIN THE PAST 12 MONTHS, YOU WORRIED THAT YOUR FOOD WOULD RUN OUT BEFORE YOU GOT MONEY TO BUY MORE.: NEVER TRUE

## 2024-05-20 SDOH — ECONOMIC STABILITY: FOOD INSECURITY: WITHIN THE PAST 12 MONTHS, THE FOOD YOU BOUGHT JUST DIDN'T LAST AND YOU DIDN'T HAVE MONEY TO GET MORE.: NEVER TRUE

## 2024-05-20 SDOH — ECONOMIC STABILITY: INCOME INSECURITY: HOW HARD IS IT FOR YOU TO PAY FOR THE VERY BASICS LIKE FOOD, HOUSING, MEDICAL CARE, AND HEATING?: NOT HARD AT ALL

## 2024-05-20 NOTE — PROGRESS NOTES
Subjective   Patient ID: Marilee Jones is a 53 y.o. female.  CC: Patient presents for acute medical problem-bilateral hand numbness. Medical assistant notes reviewed.    HPI patient presents stating she has bilateral hand numbness that is gone now for the last 10+ days.  The hands awaken her at nighttime commonly.  She denies any difficulty with dexterity or strength.  She also states she is due to come back in in a couple weeks for anxiety and depression would like to have that appointment today.  She feels her depression anxiety symptoms are overall controlled although she still having some issues with sleep at nighttime.  She sounds like she is improved her relationship with her father and tells me her mother now has progressive dementia problems.  She is currently not working.    Review of Systems     Allergies   Allergen Reactions    Glucophage Xr [Metformin Hcl Er] Other (See Comments)     Gastrointestinal upset    Nitrofurantoin Macrocrystal Diarrhea     chills  chills    Penicillins      unknown    Sulfa Antibiotics Itching, Nausea Only and Rash     Vitals:    05/20/24 1630   BP: 134/84   Pulse: (!) 102   Temp: 97 °F (36.1 °C)   SpO2: 96%         Objective   Physical Exam  Vitals and nursing note reviewed.   Constitutional:       General: She is not in acute distress.     Appearance: Normal appearance. She is well-developed and well-groomed.   Musculoskeletal:      Right hand: No deformity or tenderness. Normal range of motion. Normal strength. Normal sensation. Normal sensation of the ulnar distribution and radial distribution. Normal capillary refill.      Left hand: No deformity or tenderness. Normal range of motion. Normal strength. Normal sensation. Normal sensation of the ulnar distribution and radial distribution. Normal capillary refill.      Comments: Negative Phalen's test   Skin:     General: Skin is warm.      Findings: No rash.   Neurological:      Mental Status: She is alert and oriented

## 2024-05-22 ENCOUNTER — TELEPHONE (OUTPATIENT)
Dept: FAMILY MEDICINE CLINIC | Age: 54
End: 2024-05-22

## 2024-05-22 NOTE — TELEPHONE ENCOUNTER
Pt noticed hair breakage recently due to menopause and would like to consult with provider concerning either vitamin or supplements to strengthen follicles.      Callback number:[phone]    Please advise..

## 2024-05-23 NOTE — TELEPHONE ENCOUNTER
Would recommend patient start taking over-the-counter biotin and she could use minoxidil topically to the scalp

## 2024-06-14 ENCOUNTER — TELEPHONE (OUTPATIENT)
Dept: FAMILY MEDICINE CLINIC | Age: 54
End: 2024-06-14

## 2024-06-14 DIAGNOSIS — G56.00 CARPAL TUNNEL SYNDROME, UNSPECIFIED LATERALITY: Primary | ICD-10-CM

## 2024-06-14 NOTE — TELEPHONE ENCOUNTER
Patient called and states she was seen by WM on 5/20- and that her carpal tunnel pain has gotten significantly worse, and is now waking her up at night. She would like to be referred for possible surgery to:    [facility]  [address] [address]   [phone]    Please advise

## 2024-07-16 SDOH — HEALTH STABILITY: PHYSICAL HEALTH: ON AVERAGE, HOW MANY DAYS PER WEEK DO YOU ENGAGE IN MODERATE TO STRENUOUS EXERCISE (LIKE A BRISK WALK)?: 0 DAYS

## 2024-07-17 ENCOUNTER — OFFICE VISIT (OUTPATIENT)
Dept: ORTHOPEDIC SURGERY | Age: 54
End: 2024-07-17
Payer: MEDICAID

## 2024-07-17 VITALS — HEIGHT: 63 IN | BODY MASS INDEX: 37.39 KG/M2 | WEIGHT: 211 LBS | RESPIRATION RATE: 16 BRPM

## 2024-07-17 DIAGNOSIS — R20.0 HAND NUMBNESS: Primary | ICD-10-CM

## 2024-07-17 PROCEDURE — 99203 OFFICE O/P NEW LOW 30 MIN: CPT | Performed by: PHYSICIAN ASSISTANT

## 2024-07-17 NOTE — PROGRESS NOTES
provided.  Instructions were given regarding splint use and wear as well as suggestions for use of the other modalities were discussed.  I have clearly explained to her that the above outlined treatment plan should not be expected to 'cure' her carpal tunnel syndrome, but we are rather treating the symptoms with which she presents.  She has understood that in order to achieve more durable relief of her symptoms and to prevent future worsening or further damage, that definitive surgical treatment would be required. Ms. Marilee Jones  voiced an appropriate understanding of our discussion, the options available to her, and of the expectations of her selected  treatment.    I will ask Ms. Marilee Jones. to undergo electrodiagnostic studies of the symptomatic both sides.  I will ask that she schedule a follow-up appointment with me to review the results of this study after it has been completed.    I have also discussed with Ms. Marilee Jones  the other treatment options available to her  for this condition.  We hve today selected to proceed with conservative management.  She and I have agreed that if our current course of conservative treatment does not prove to be effective over the short term future, that she will schedule a follow-up appointment to discuss and select an alternate course of therapy including possibly injection or surgical treatment.       Ms. Marilee Jones has been given a full verbal list of instructions and precautions related to her present condition.  I have asked her to followup with me in the office at the prescribed time. She is also specifically requested to call or return to the office sooner if her symptoms change or worsen prior to the next scheduled appointment.

## 2024-07-22 ENCOUNTER — TELEPHONE (OUTPATIENT)
Dept: ORTHOPEDIC SURGERY | Age: 54
End: 2024-07-22

## 2024-07-22 NOTE — TELEPHONE ENCOUNTER
Appointment Request     Patient requesting earlier appointment: Yes  Appointment offered to patient: 08/28/2024  Patient Contact Number: [phone]    PATIENTS EMG GOT MOVED UP SHE IS WANTING TO BE WORKED IN TO SEE DR ORTIZ SOONER

## 2024-07-23 ENCOUNTER — PROCEDURE VISIT (OUTPATIENT)
Dept: NEUROLOGY | Age: 54
End: 2024-07-23
Payer: MEDICAID

## 2024-07-23 DIAGNOSIS — R20.0 NUMBNESS AND TINGLING IN BOTH HANDS: Primary | ICD-10-CM

## 2024-07-23 DIAGNOSIS — R20.2 NUMBNESS AND TINGLING IN BOTH HANDS: Primary | ICD-10-CM

## 2024-07-23 PROCEDURE — 95886 MUSC TEST DONE W/N TEST COMP: CPT | Performed by: PSYCHIATRY & NEUROLOGY

## 2024-07-23 PROCEDURE — 95911 NRV CNDJ TEST 9-10 STUDIES: CPT | Performed by: PSYCHIATRY & NEUROLOGY

## 2024-07-23 NOTE — PATIENT INSTRUCTIONS
Verbal consent was obtained from patient and/or patient's advocate for in office procedure with Dr. Jackie Aviles MD (EMG or EEG).

## 2024-07-23 NOTE — TELEPHONE ENCOUNTER
Left a vm for the patient.  I let her know that she has the soonest appointment out at .  If she can go to a different office we could possibly see her sooner.  Told her to call back if she is ok with going to a different office.

## 2024-07-23 NOTE — PROGRESS NOTES
Dear Marci Machuca PA-C  [address]  [address]  [place] [address]    I had the pleasure of seeing your patient Marilee Jones  today for EMG and NCV. I have attached a detailed summary below:        Electromyography report        [facility]  [address], [address]  [place] [address]      Patient: Marilee Jones    MR Number: [id number]  YOB: 1970  Date of Visit: 7/23/2024    Clinical history:  The patient is a 54 y.o. years old female with chronic history of bilateral hand numbness and tingling more right than left.  On exam: No sensory deficit or weakness today.  No atrophy.  Negative Tinel sign.    Findings:   For full details about such findings, please see attached report. Needle examination was recorded using monopolar needle in selected muscles. Unless otherwise noted, the temperature of the limb was monitored and maintained between 32-36°C during the performance of the NCV testing.     1.  Right median motor distal latency was mildly prolonged with normal amplitude and conduction velocity.  2.  Left median motor distal latency was mildly prolonged with normal amplitude and conduction velocity  3.  Left ulnar motor distal latency, amplitude and conduction velocity were within normal limit  4.  Right ulnar motor distal latency, amplitude and conduction velocity were within normal limit  5.  Right ulnar sensory distal latency, amplitude and conduction velocity were within normal limits  6.  Left ulnar sensory distal latency, amplitude and conduction velocity were within normal limit   7.8.  Right and left median sensory responses were absent.    9.  Left radial sensory distal latency, amplitude and conduction velocity were within normal limit  10. Needle examinations of bilateral upper extremities were performed in selected muscles. Needle examination of these selected muscle showed normal insertional activities, morphology, amplitude, and recruitment of

## 2024-07-29 DIAGNOSIS — G25.81 RESTLESS LEG SYNDROME: ICD-10-CM

## 2024-07-29 RX ORDER — CLONAZEPAM 2 MG/1
TABLET ORAL
Qty: 60 TABLET | Refills: 0 | Status: SHIPPED | OUTPATIENT
Start: 2024-07-29 | End: 2024-08-28

## 2024-07-29 NOTE — TELEPHONE ENCOUNTER
Medication:   Requested Prescriptions     Pending Prescriptions Disp Refills    clonazePAM (KLONOPIN) 2 MG tablet [Pharmacy Med Name: clonazePAM 2 MG TABLET] 60 tablet      Sig: TAKE 1 TABLET BY MOUTH 2 TIMES A DAY AS NEEDED FOR RESTLESS LEG SYNDROME      Last Filled:  3/4/2024    Patient Phone Number: [phone] (home)     Last appt: 5/20/2024   Next appt: 9/12/2024    Last OARRS:       3/29/2019    12:34 PM   RX Monitoring   Attestation The Prescription Monitoring Report for this patient was reviewed today.     PDMP Monitoring:    Last PDMP [place] as Reviewed ([place]):  Review User Review Instant Review Result   ABEL ABURTO 5/20/2024  4:38 PM Reviewed PDMP [1]     Preferred Pharmacy:   [facility] [id number] - [place], [place] - [address] [phone] - F [phone]  [address]  [facility] [address]  Phone: [phone] Fax: [phone]    [facility] - [address] -  [phone] - F [phone]  [address] [facility] [address]  Phone: [phone] Fax: [phone]    [facility] [id number] - [place], [place] - [address] -  [phone] - F [phone]  [address] [address]  Phone: [phone] Fax: [phone]    [id number] - [place], [place] - [address] - P [phone] - F [phone]  [address]  [facility] [address]  Phone: [phone] Fax: [phone]

## 2024-07-31 ENCOUNTER — TELEPHONE (OUTPATIENT)
Dept: ORTHOPEDIC SURGERY | Age: 54
End: 2024-07-31

## 2024-07-31 NOTE — TELEPHONE ENCOUNTER
Appointment Request     Patient requesting earlier appointment: No  Appointment offered to patient: CONRAD POLANCO  Patient Contact Number: [phone]       PT CALLING IN DUE WANTING T CHANGE HER APPT TO A VV FOR TODAY.     PLEASE CALL BACK PT AT THE ABOVE NUMBER LISTED

## 2024-08-14 ENCOUNTER — OFFICE VISIT (OUTPATIENT)
Dept: ORTHOPEDIC SURGERY | Age: 54
End: 2024-08-14
Payer: MEDICAID

## 2024-08-14 VITALS — BODY MASS INDEX: 37.39 KG/M2 | WEIGHT: 211 LBS | HEIGHT: 63 IN | RESPIRATION RATE: 16 BRPM

## 2024-08-14 DIAGNOSIS — G56.03 BILATERAL CARPAL TUNNEL SYNDROME: Primary | ICD-10-CM

## 2024-08-14 PROCEDURE — 99214 OFFICE O/P EST MOD 30 MIN: CPT | Performed by: PHYSICIAN ASSISTANT

## 2024-08-14 NOTE — PROGRESS NOTES
Ms. Marilee Jones returns today in follow-up of her previously treated  bilateral Carpal Tunnel Syndrome.  She was last seen by me in July, 2024 at which time she was treated with electrodiagnostic studies were ordered.  She experienced no relief of her initial symptoms.  She  has noticed symptom worsening over the last several weeks.  She returns today with worsened symptoms of bilateral numbness in the Median Innervated digits and tingling in the Median Innervated digits, requesting further treatment.  Due to the dynamic and progressive nature of Carpal Tunnel Syndrome, It is clinically necessary to carefully re-evaluate Ms. Marilee Jones today, prior to proceeding with any further treatment or intervention, to assure the clinical appropriateness of any previously considered treatment, at this time.  Previous treatment for carpal tunnel syndrome has included Splinting of the both wrist for a period of 6 weeks which relieved her symptoms A Little..      I have today reviewed with Marilee Jones the clinically relevant, past medical history, medications, allergies,  family history, social history, and Review Of Systems & I have documented any details relevant to today's presenting complaints in my history above.  Ms. Marilee Jones's self-reported past medical history, medications, allergies,  family history, social history, and Review Of Systems have been scanned into the chart under the \"Media\" tab.    Physical Exam:  Vitals  Respirations: 16  Height: 160 cm (5' 3\")  Weight - Scale: 95.7 kg (211 lb)  Ms. Marilee Jones appears well, she is in no apparent distress, she demonstrates appropriate mood & affect.      Skin: Normal in appearance, Normal Color, and Free of Lesions Bilaterally   Digital range of motion is without significant limitation bilaterally  Wrist range of motion is Full bilaterally  There is no evidence of gross joint instability bilaterally.  Sensation is subjectively tingling in the Median

## 2024-08-15 ENCOUNTER — TELEPHONE (OUTPATIENT)
Dept: FAMILY MEDICINE CLINIC | Age: 54
End: 2024-08-15

## 2024-08-15 NOTE — TELEPHONE ENCOUNTER
----- Message from Kingsley CANO sent at 8/15/2024  1:56 PM EDT -----  Regarding: [facility] Appointment Request  E[facility] Appointment Request    Patient needs appointment for E[facility] Appointment Type: Pre-Op Visit.    Patient Requested Dates(s): as soon as possible before the surgery the week of aug 26 would be the best for her  Patient Requested Time: as soon as possible before the surgery   Provider Name:  Jose L James or any PCP under the same practice     Reason for Appointment Request: Established Patient - Available appointments did not meet patient need Carpal tunne  surgery date Aug 29,2024   --------------------------------------------------------------------------------------------------------------------------    Relationship to Patient: Self     Call Back Information: OK to leave message on voicemail  Preferred Call Back Number: Phone [phone]

## 2024-08-16 ENCOUNTER — PREP FOR PROCEDURE (OUTPATIENT)
Dept: ORTHOPEDIC SURGERY | Age: 54
End: 2024-08-16

## 2024-08-16 DIAGNOSIS — G56.02 LEFT CARPAL TUNNEL SYNDROME: ICD-10-CM

## 2024-08-16 DIAGNOSIS — G56.01 RIGHT CARPAL TUNNEL SYNDROME: Primary | ICD-10-CM

## 2024-08-26 NOTE — PROGRESS NOTES
DATE 8/29/2024___      PLACE _x__ [address]                          [id number] ___                                             ___ [address]                           Arrival 0915__     Surgeons office to give time ___      Surgery dates,times and arrival times are subject to change.Please check with your surgeon.  Bring a photo I.D.,insurance card and form of payment if you have a co pay.  Plan for someone 18 years or older to stay on site while you are her and to take you home after surgery.You are not permitted to drive yourself home. You cannot leave via Uber, Lyft, Taxi or Medical Transport by yourself. You must have someone with you. It is strongly suggested that someone stay with you the first 24 hours after anesthesia. Your surgery will be cancelled if you do not have a ride home. A parent or legal guardian guardian must stay with a child until the child is discharged. Please do not bring other children.  A History/Physical is required to be completed and dated within 30 days of your surgery. To be done by PCP 8/27__ Surgeon ___or Hospitalist ___  You may be required to get labs __ EKG __ or a chest Xray ___ prior to surgery. To be done by ____  Nothing to eat or drink after midnight the evening prior to surgery.  If your surgeon requires a bowel prep, follow their instructions.  You may brush your teeth.  Bring a complete list of your medications including vitamins and supplement with the name,dose and frequency.  Take the following medications with a sip of water the AM of surgery _sertraline and omeprazole___________________________________   [facility] patients do not take any medications the AM of surgery.  If you can not be reached by phone to give specific medication instructions,you may use your inhalers,take your heart, blood pressure,seizure and thyroid medications with a sip of water the AM of surgery. Bring your rescue inhaler with you if you use one.  DO NOT take blood pressure  belongings in the car until you are assigned a room.  If you develop any illness prior to surgery let your surgeon know (fever,cough ,cold sore throat,nausea or vomiting).  If you have any skin breakdown,signs of infection or dental issues, notify your surgeon.  Make sure your voice mail is set up and not full so you are able to receive messages regarding your surgery.  Visitor policies are subject to change- check with your care team.   Masking is at the discretion of the facility.  Other ________________________________________________________________________________________________________      For any questions call Pre-Admission testing at  [phone]   Fax  [phone]    All of the above information was  reviewed with patient/caregiver and they verbalize understanding.    Patient/Representative per phone _patient__  Patient not reached instructions left on voicemail ___ Office notified unable to reach _____  Above instructions sent to MY Chart ___

## 2024-08-27 ENCOUNTER — OFFICE VISIT (OUTPATIENT)
Dept: FAMILY MEDICINE CLINIC | Age: 54
End: 2024-08-27
Payer: MEDICAID

## 2024-08-27 VITALS
BODY MASS INDEX: 39.68 KG/M2 | HEART RATE: 84 BPM | WEIGHT: 224 LBS | OXYGEN SATURATION: 97 % | SYSTOLIC BLOOD PRESSURE: 132 MMHG | DIASTOLIC BLOOD PRESSURE: 86 MMHG | TEMPERATURE: 97.4 F

## 2024-08-27 DIAGNOSIS — Z01.818 PREOP EXAMINATION: ICD-10-CM

## 2024-08-27 DIAGNOSIS — Z13.220 SCREENING FOR CHOLESTEROL LEVEL: Primary | ICD-10-CM

## 2024-08-27 DIAGNOSIS — R73.9 HYPERGLYCEMIA: ICD-10-CM

## 2024-08-27 DIAGNOSIS — G56.03 BILATERAL CARPAL TUNNEL SYNDROME: Primary | ICD-10-CM

## 2024-08-27 PROCEDURE — 99213 OFFICE O/P EST LOW 20 MIN: CPT | Performed by: NURSE PRACTITIONER

## 2024-08-27 PROCEDURE — 93000 ELECTROCARDIOGRAM COMPLETE: CPT | Performed by: NURSE PRACTITIONER

## 2024-08-27 RX ORDER — M-VIT,TX,IRON,MINS/CALC/FOLIC 27MG-0.4MG
1 TABLET ORAL DAILY
COMMUNITY

## 2024-08-27 NOTE — PROGRESS NOTES
Preoperative Consultation    Marilee Jones  YOB: 1970    This patient presents to the office today for a preoperative consultation at the request of surgeon, Dr. Yariel Carlisle, who plans on performing right carpal tunnel release on 8/29 and left carpal tunnel release on September 19 at [facility].      Planned anesthesia: General   Known anesthesia problems: None   Bleeding risk: No recent or remote history of abnormal bleeding  Personal or FH of DVT/PE: No      Patient Active Problem List   Diagnosis    Anxiety    Arthritis    Gastroesophageal reflux disease without esophagitis    Current moderate episode of major depressive disorder (HCC)    Venous (peripheral) insufficiency    PMS (premenstrual syndrome)    Restless leg syndrome    Obesity due to excess calories    Monilial intertrigo    Iron deficiency anemia    Right carpal tunnel syndrome    Left carpal tunnel syndrome     Past Surgical History:   Procedure Laterality Date    KNEE ARTHROPLASTY Left 11/2022    SLEEVE GASTRECTOMY  12/04/2012    lap    TONSILLECTOMY      TUBAL LIGATION      UPPER GASTROINTESTINAL ENDOSCOPY  09/14/2012       Allergies   Allergen Reactions    Nitrofurantoin Macrocrystal Diarrhea     chills  chills    Penicillins      unknown    Sulfa Antibiotics Itching, Nausea Only and Rash     Outpatient Medications Marked as Taking for the 8/27/24 encounter (Office Visit) with Sammie Garcia APRN - NP   Medication Sig Dispense Refill    Multiple Vitamins-Minerals (THERAPEUTIC MULTIVITAMIN-MINERALS) tablet Take 1 tablet by mouth daily      COLLAGEN PO Take by mouth      clonazePAM (KLONOPIN) 2 MG tablet TAKE 1 TABLET BY MOUTH 2 TIMES A DAY AS NEEDED FOR RESTLESS LEG SYNDROME 60 tablet 0    mirtazapine (REMERON) 30 MG tablet Take 1 tablet by mouth nightly 90 tablet 1    omeprazole (PRILOSEC) 40 MG delayed release capsule TAKE ONE CAPSULE BY MOUTH DAILY 90 capsule 1    sertraline (ZOLOFT) 100 MG tablet TAKE 2  TABLETS BY MOUTH DAILY 180 tablet 1    ibuprofen (ADVIL;MOTRIN) 800 MG tablet Take 1 tablet by mouth every 8 hours as needed 270 tablet 0       Social History     Tobacco Use    Smoking status: Former     Current packs/day: 0.00     Average packs/day: 1 pack/day for 28.2 years (28.2 ttl pk-yrs)     Types: Cigarettes     Start date: 1984     Quit date: 3/28/2012     Years since quittin.4    Smokeless tobacco: Never   Substance Use Topics    Alcohol use: No     Family History   Problem Relation Age of Onset    Dementia Mother     Diabetes Father     High Blood Pressure Maternal Grandmother     High Cholesterol Maternal Grandmother     Arthritis Maternal Grandmother     High Blood Pressure Maternal Grandfather     High Cholesterol Maternal Grandfather     Heart Disease Maternal Grandfather     Stroke Paternal Grandmother     Diabetes Paternal Grandfather     Cancer Paternal Grandfather        Review of Systems  A comprehensive review of systems was negative except for what was noted in the HPI.     Physical Exam   /86 (Site: Left Upper Arm, Position: Sitting, Cuff Size: Large Adult)   Pulse 84   Temp 97.4 °F (36.3 °C) (Infrared)   Wt 101.6 kg (224 lb)   LMP 2019   SpO2 97%   BMI 39.68 kg/m²   Weight - Scale: 101.6 kg (224 lb)   Constitutional: She is oriented to person, place, and time. She appears well-developed and well-nourished. No distress.   HENT:   Head: Normocephalic and atraumatic.   Mouth/Throat: Uvula is midline, oropharynx is clear and moist and mucous membranes are normal.   Eyes: Conjunctivae and EOM are normal. Pupils are equal, round, and reactive to light.   Neck: Trachea normal and normal range of motion. Neck supple. No JVD present. Carotid bruit is not present. No mass and no thyromegaly present.   Cardiovascular: Normal rate, regular rhythm, normal heart sounds and intact distal pulses.  Exam reveals no gallop and no friction rub.  No murmur heard.  Pulmonary/Chest:

## 2024-08-28 ENCOUNTER — ANESTHESIA EVENT (OUTPATIENT)
Dept: OPERATING ROOM | Age: 54
End: 2024-08-28
Payer: MEDICAID

## 2024-08-29 ENCOUNTER — ANESTHESIA (OUTPATIENT)
Dept: OPERATING ROOM | Age: 54
End: 2024-08-29
Payer: MEDICAID

## 2024-08-29 ENCOUNTER — HOSPITAL ENCOUNTER (OUTPATIENT)
Age: 54
Setting detail: OUTPATIENT SURGERY
Discharge: HOME | End: 2024-08-29
Attending: ORTHOPAEDIC SURGERY | Admitting: ORTHOPAEDIC SURGERY
Payer: MEDICAID

## 2024-08-29 VITALS
TEMPERATURE: 97.3 F | RESPIRATION RATE: 20 BRPM | HEART RATE: 62 BPM | OXYGEN SATURATION: 97 % | BODY MASS INDEX: 40.03 KG/M2 | SYSTOLIC BLOOD PRESSURE: 148 MMHG | DIASTOLIC BLOOD PRESSURE: 88 MMHG | WEIGHT: 226 LBS

## 2024-08-29 LAB
GLUCOSE BLD-MCNC: 99 MG/DL (ref 70–99)
PERFORMED ON: NORMAL

## 2024-08-29 PROCEDURE — 2500000003 HC RX 250 WO HCPCS: Performed by: ORTHOPAEDIC SURGERY

## 2024-08-29 PROCEDURE — 6360000002 HC RX W HCPCS

## 2024-08-29 PROCEDURE — 6360000002 HC RX W HCPCS: Performed by: ORTHOPAEDIC SURGERY

## 2024-08-29 PROCEDURE — 7100000010 HC PHASE II RECOVERY - FIRST 15 MIN: Performed by: ORTHOPAEDIC SURGERY

## 2024-08-29 PROCEDURE — 3700000001 HC ADD 15 MINUTES (ANESTHESIA): Performed by: ORTHOPAEDIC SURGERY

## 2024-08-29 PROCEDURE — 7100000000 HC PACU RECOVERY - FIRST 15 MIN: Performed by: ORTHOPAEDIC SURGERY

## 2024-08-29 PROCEDURE — 3600000013 HC SURGERY LEVEL 3 ADDTL 15MIN: Performed by: ORTHOPAEDIC SURGERY

## 2024-08-29 PROCEDURE — 7100000011 HC PHASE II RECOVERY - ADDTL 15 MIN: Performed by: ORTHOPAEDIC SURGERY

## 2024-08-29 PROCEDURE — 3600000003 HC SURGERY LEVEL 3 BASE: Performed by: ORTHOPAEDIC SURGERY

## 2024-08-29 PROCEDURE — A4217 STERILE WATER/SALINE, 500 ML: HCPCS | Performed by: ORTHOPAEDIC SURGERY

## 2024-08-29 PROCEDURE — 2500000003 HC RX 250 WO HCPCS

## 2024-08-29 PROCEDURE — 64721 CARPAL TUNNEL SURGERY: CPT | Performed by: ORTHOPAEDIC SURGERY

## 2024-08-29 PROCEDURE — 7100000001 HC PACU RECOVERY - ADDTL 15 MIN: Performed by: ORTHOPAEDIC SURGERY

## 2024-08-29 PROCEDURE — 3700000000 HC ANESTHESIA ATTENDED CARE: Performed by: ORTHOPAEDIC SURGERY

## 2024-08-29 PROCEDURE — 2709999900 HC NON-CHARGEABLE SUPPLY: Performed by: ORTHOPAEDIC SURGERY

## 2024-08-29 PROCEDURE — 2580000003 HC RX 258: Performed by: ORTHOPAEDIC SURGERY

## 2024-08-29 RX ORDER — SODIUM CHLORIDE 0.9 % (FLUSH) 0.9 %
5-40 SYRINGE (ML) INJECTION EVERY 12 HOURS SCHEDULED
Status: DISCONTINUED | OUTPATIENT
Start: 2024-08-29 | End: 2024-08-29 | Stop reason: HOSPADM

## 2024-08-29 RX ORDER — LIDOCAINE HYDROCHLORIDE 20 MG/ML
INJECTION, SOLUTION EPIDURAL; INFILTRATION; INTRACAUDAL; PERINEURAL PRN
Status: DISCONTINUED | OUTPATIENT
Start: 2024-08-29 | End: 2024-08-29 | Stop reason: SDUPTHER

## 2024-08-29 RX ORDER — MAGNESIUM HYDROXIDE 1200 MG/15ML
LIQUID ORAL CONTINUOUS PRN
Status: COMPLETED | OUTPATIENT
Start: 2024-08-29 | End: 2024-08-29

## 2024-08-29 RX ORDER — HYDROMORPHONE HYDROCHLORIDE 2 MG/ML
0.5 INJECTION, SOLUTION INTRAMUSCULAR; INTRAVENOUS; SUBCUTANEOUS EVERY 5 MIN PRN
Status: DISCONTINUED | OUTPATIENT
Start: 2024-08-29 | End: 2024-08-29 | Stop reason: HOSPADM

## 2024-08-29 RX ORDER — SODIUM CHLORIDE 9 MG/ML
INJECTION, SOLUTION INTRAVENOUS PRN
Status: DISCONTINUED | OUTPATIENT
Start: 2024-08-29 | End: 2024-08-29 | Stop reason: HOSPADM

## 2024-08-29 RX ORDER — MEPERIDINE HYDROCHLORIDE 25 MG/ML
12.5 INJECTION INTRAMUSCULAR; INTRAVENOUS; SUBCUTANEOUS EVERY 5 MIN PRN
Status: DISCONTINUED | OUTPATIENT
Start: 2024-08-29 | End: 2024-08-29 | Stop reason: HOSPADM

## 2024-08-29 RX ORDER — ONDANSETRON 2 MG/ML
INJECTION INTRAMUSCULAR; INTRAVENOUS PRN
Status: DISCONTINUED | OUTPATIENT
Start: 2024-08-29 | End: 2024-08-29 | Stop reason: SDUPTHER

## 2024-08-29 RX ORDER — MIDAZOLAM HYDROCHLORIDE 1 MG/ML
INJECTION INTRAMUSCULAR; INTRAVENOUS PRN
Status: DISCONTINUED | OUTPATIENT
Start: 2024-08-29 | End: 2024-08-29 | Stop reason: SDUPTHER

## 2024-08-29 RX ORDER — HYDROMORPHONE HYDROCHLORIDE 2 MG/ML
0.25 INJECTION, SOLUTION INTRAMUSCULAR; INTRAVENOUS; SUBCUTANEOUS EVERY 5 MIN PRN
Status: DISCONTINUED | OUTPATIENT
Start: 2024-08-29 | End: 2024-08-29 | Stop reason: HOSPADM

## 2024-08-29 RX ORDER — OXYCODONE HYDROCHLORIDE 5 MG/1
5 TABLET ORAL
Status: DISCONTINUED | OUTPATIENT
Start: 2024-08-29 | End: 2024-08-29 | Stop reason: HOSPADM

## 2024-08-29 RX ORDER — PROPOFOL 10 MG/ML
INJECTION, EMULSION INTRAVENOUS PRN
Status: DISCONTINUED | OUTPATIENT
Start: 2024-08-29 | End: 2024-08-29 | Stop reason: SDUPTHER

## 2024-08-29 RX ORDER — SODIUM CHLORIDE 0.9 % (FLUSH) 0.9 %
5-40 SYRINGE (ML) INJECTION PRN
Status: DISCONTINUED | OUTPATIENT
Start: 2024-08-29 | End: 2024-08-29 | Stop reason: HOSPADM

## 2024-08-29 RX ORDER — SODIUM CHLORIDE 9 MG/ML
INJECTION, SOLUTION INTRAVENOUS CONTINUOUS
Status: DISCONTINUED | OUTPATIENT
Start: 2024-08-29 | End: 2024-08-29 | Stop reason: HOSPADM

## 2024-08-29 RX ORDER — ONDANSETRON 2 MG/ML
4 INJECTION INTRAMUSCULAR; INTRAVENOUS
Status: DISCONTINUED | OUTPATIENT
Start: 2024-08-29 | End: 2024-08-29 | Stop reason: HOSPADM

## 2024-08-29 RX ORDER — NALOXONE HYDROCHLORIDE 0.4 MG/ML
INJECTION, SOLUTION INTRAMUSCULAR; INTRAVENOUS; SUBCUTANEOUS PRN
Status: DISCONTINUED | OUTPATIENT
Start: 2024-08-29 | End: 2024-08-29 | Stop reason: HOSPADM

## 2024-08-29 RX ADMIN — PROPOFOL 20 MG: 10 INJECTION, EMULSION INTRAVENOUS at 10:59

## 2024-08-29 RX ADMIN — MIDAZOLAM 2 MG: 1 INJECTION INTRAMUSCULAR; INTRAVENOUS at 10:51

## 2024-08-29 RX ADMIN — SODIUM CHLORIDE: 9 INJECTION, SOLUTION INTRAVENOUS at 09:50

## 2024-08-29 RX ADMIN — LIDOCAINE HYDROCHLORIDE 100 MG: 20 INJECTION, SOLUTION EPIDURAL; INFILTRATION; INTRACAUDAL; PERINEURAL at 10:53

## 2024-08-29 RX ADMIN — ONDANSETRON 4 MG: 2 INJECTION INTRAMUSCULAR; INTRAVENOUS at 11:01

## 2024-08-29 RX ADMIN — PROPOFOL 150 MG: 10 INJECTION, EMULSION INTRAVENOUS at 10:53

## 2024-08-29 RX ADMIN — PROPOFOL 150 MCG/KG/MIN: 10 INJECTION, EMULSION INTRAVENOUS at 10:54

## 2024-08-29 ASSESSMENT — ENCOUNTER SYMPTOMS: SHORTNESS OF BREATH: 0

## 2024-08-29 ASSESSMENT — PAIN - FUNCTIONAL ASSESSMENT: PAIN_FUNCTIONAL_ASSESSMENT: NONE - DENIES PAIN

## 2024-08-29 NOTE — ANESTHESIA POSTPROCEDURE EVALUATION
Department of Anesthesiology  Postprocedure Note    Patient: Marilee Jones  MRN: [id number]  YOB: 1970  Date of evaluation: 8/29/2024    Procedure Summary       Date: 08/29/24 Room / Location: [address]    Anesthesia Start: 1051 Anesthesia Stop: 1109    Procedure: RIGHT CARPAL TUNNEL RELEASE (Right: Wrist) Diagnosis:       Right carpal tunnel syndrome      (Right carpal tunnel syndrome [G56.01])    Surgeons: Yariel Carlisle MD Responsible Provider: Claudio Staton MD    Anesthesia Type: MAC ASA Status: 3            Anesthesia Type: No value filed.    Ang Phase I: Ang Score: 10    Ang Phase II: Ang Score: 10    Anesthesia Post Evaluation    Patient location during evaluation: PACU  Patient participation: complete - patient participated  Level of consciousness: awake  Airway patency: patent  Nausea & Vomiting: no nausea and no vomiting  Cardiovascular status: hemodynamically stable  Respiratory status: acceptable  Hydration status: stable  Multimodal analgesia pain management approach  Pain management: adequate    No notable events documented.

## 2024-08-29 NOTE — PROGRESS NOTES
Pt discharged home per private vehicle with a  responsible adult who states they will be with them for the next 24 hours.  Wheeled to front of the hospital .

## 2024-08-29 NOTE — DISCHARGE INSTRUCTIONS
Post-Operative Instructions    Carpal Tunnel Release:    Keep hand elevated with fingers above eye-level to control swelling.  Keep hand and bandage clean and dry.  Do not change or unwrap bandage.  Please leave bandage in place until your follow-up appointment.  Maintain finger motion by fully straightening and fully bending fingers and thumb at least once an hour (while awake).  This may cause some discomfort, but will not damage surgery.  You may use your operated hand for lightweight tasks (e.g. writing, eating, dressing, etc.).  NO LIFTING, CARRYING OR HEAVY USE.    Most Patients do not have \"Serious Pain\" after this procedure and thus most patients do not require prescription pain medication.  You may take over the counter medication (Tylenol, Advil, Aleve, etc.) as needed.  If you are unable to tolerate the discomfort after your surgery and the OTC medications do not provide some relief, you may contact our office to discuss other options..    Please call the office at [phone]  in 24 - 48 hours to schedule a follow up appointment for approximately 7 - 10 days after surgery.  Please call the office at [phone]  if you have any questions or problems.           Yariel Carlisle MD      [facility] DISCHARGE INSTRUCTIONS    8/29/2024     Wear your seatbelt home.  You are under the influence of drugs. Do not drink alcohol, drive, operate machinery, or make any important decisions or sign any legal documents for 24 hours  A responsible adult needs to be with you for 24 hours.  You may experience lightheadedness, dizziness, nausea, heightened emotions and/or sleepiness following surgery.  Rest at home today- increase activity as tolerated.  Progress slowly to a regular diet and drink plenty of fluids unless your physician has instructed you otherwise.  If nausea becomes a problem, call your physician.  Coughing, sore throat, and muscle aches are other side effects of anesthesia and should improve with

## 2024-08-29 NOTE — OP NOTE
OPERATIVE REPORT          Patient:  Marilee Jones    YOB: 1970  Date of Service:  8/29/2024   Location:  [facility]    Preoperative Diagnosis:    Right carpal tunnel syndrome    Postoperative Diagnosis:    Same    Procedure:    Right carpal tunnel release      Surgeon:    Yariel Carlisle MD    Surgical Assistant:    Marci Machuca PA-C    Anesthesia:   Local with Sedation    Blood Loss:   Minimal    Complications:  None    Tourniquet Time: 3 minutes     Indications:  Ms. Marilee Jones  is a 54 y.o. year-old female with Right carpal tunnel syndrome. I have discussed preoperatively with her  the complications, limitations, expectations, alternatives and risks of surgical care, which she has understood.  All of her questions have been fully answered, and she has provided written informed consent to proceed.    Procedure:   After written consent was obtained and the proper operative site was identified and marked, Ms. Marilee Jones was brought to the operating room, placed in the supine position on the operating room table with the Right arm extended upon a hand table.  Under an appropriate level of sedation, local anesthetic (1% Lidocaine and 1/2% Marcaine both without Epinephrine) was instilled in the planned surgical field. Her Right upper extremity was prepped and draped in the usual sterile fashion.     After Esmarch exsanguination, the pneumotourniquet was inflated to 250 mm of mercury.   A 2 cm longitudinal incision was fashioned at the base of the palm, paralleling the longitudinal thenar crease. Dissection was carried carefully through the subcutaneous tissue identifying and protecting the neurovascular structures. The palmar fascia was incised longitudinally, exposing the transverse carpal ligament. The transverse carpal ligament was incised from its proximal to distal most extent, under direct visualization. The terminal 2 cm of antebrachial fascia was similarly incised under  direct visualization. The contents of the carpal tunnel were inspected and found to be free of mass, lesion or other abnormality. Digital palpation revealed no further constriction about the median nerve.      The wound was irrigated copiously with sterile saline for irrigation and the pneumotourniquet was deflated after a period of 3 minutes elevation.  The fingers were immediately pink & well perfused.  Hemostasis was easily obtained with direct pressure and electrocautery and the wound was closed with interrupted sutures.  The wound was dressed with adaptic, dry sterile dressings and a bulky soft hand & wrist dressing was applied. Ms. Marilee Jones  was awakened from light sedation, having tolerated the procedure without apparent complication.  She  was returned to the recovery room in stable condition.     At the conclusion of the procedure all needle, instrument, and sponge counts were correct.               Yariel Carlisle MD   8/29/2024, 10:49 AM

## 2024-08-29 NOTE — ANESTHESIA PRE PROCEDURE
Department of Anesthesiology  Preprocedure Note       Name:  Marilee Jones   Age:  54 y.o.  :  1970                                          MRN:  [id number]         Date:  2024      Surgeon: Surgeon(s):  Yariel Carlisle MD    Procedure: Procedure(s):  RIGHT CARPAL TUNNEL RELEASE    Medications prior to admission:   Prior to Admission medications    Medication Sig Start Date End Date Taking? Authorizing Provider   Multiple Vitamins-Minerals (THERAPEUTIC MULTIVITAMIN-MINERALS) tablet Take 1 tablet by mouth daily   Yes Nimesh Hopkins MD   COLLAGEN PO Take by mouth   Yes Nimesh Hopkins MD   clonazePAM (KLONOPIN) 2 MG tablet TAKE 1 TABLET BY MOUTH 2 TIMES A DAY AS NEEDED FOR RESTLESS LEG SYNDROME 24 Yes Jacqueline Mondragon APRN - CNP   mirtazapine (REMERON) 30 MG tablet Take 1 tablet by mouth nightly 24  Yes Jose L James MD   omeprazole (PRILOSEC) 40 MG delayed release capsule TAKE ONE CAPSULE BY MOUTH DAILY 3/4/24  Yes Jose L James MD   sertraline (ZOLOFT) 100 MG tablet TAKE 2 TABLETS BY MOUTH DAILY 24  Yes Adrian Chávez Jr., MD   ibuprofen (ADVIL;MOTRIN) 800 MG tablet Take 1 tablet by mouth every 8 hours as needed 23  Yes Jose L James MD   tretinoin (RETIN-A) 0.025 % cream Apply topically nightly.  Patient not taking: Reported on 2024 3/28/23   Jose L James MD       Current medications:    Current Facility-Administered Medications   Medication Dose Route Frequency Provider Last Rate Last Admin    0.9 % sodium chloride infusion   IntraVENous Continuous Yariel Carlisle  mL/hr at 24 0950 New Bag at 24 0950       Allergies:    Allergies   Allergen Reactions    Nitrofurantoin Macrocrystal Diarrhea     chills  chills    Penicillins      Unknown GRANDPA AND DAUGHTER HAVE ALLERGY. Pt unaware if she has allergy    Sulfa Antibiotics Itching, Nausea Only and Rash       Problem List:    Patient Active Problem List    Diagnosis Code    Anxiety F41.9    Arthritis M19.90    Gastroesophageal reflux disease without esophagitis K21.9    Current moderate episode of major depressive disorder (HCC) F32.1    Venous (peripheral) insufficiency I87.2    PMS (premenstrual syndrome) N94.3    Restless leg syndrome G25.81    Obesity due to excess calories E66.09    Monilial intertrigo B37.2    Iron deficiency anemia D50.9    Right carpal tunnel syndrome G56.01    Left carpal tunnel syndrome G56.02       Past Medical History:        Diagnosis Date    Anxiety     well controlled on meds    Arthritis     Chronic back pain     Depression     well controlled as of 12    Diabetes mellitus (HCC)     type 2    GERD (gastroesophageal reflux disease)     Hyperlipidemia     Hypertension     Unspecified sleep apnea        Past Surgical History:        Procedure Laterality Date    KNEE ARTHROPLASTY Left 2022    SLEEVE GASTRECTOMY  2012    lap    TONSILLECTOMY      TUBAL LIGATION      UPPER GASTROINTESTINAL ENDOSCOPY  2012       Social History:    Social History     Tobacco Use    Smoking status: Former     Current packs/day: 0.00     Average packs/day: 1 pack/day for 28.2 years (28.2 ttl pk-yrs)     Types: Cigarettes     Start date: 1984     Quit date: 3/28/2012     Years since quittin.4    Smokeless tobacco: Never   Substance Use Topics    Alcohol use: No                                Counseling given: Not Answered      Vital Signs (Current):   Vitals:    24 0926   BP: (!) 158/85   Pulse: 78   Resp: 16   Temp: 97.9 °F (36.6 °C)   TempSrc: Temporal   SpO2: 97%   Weight: 102.5 kg (226 lb)                                              BP Readings from Last 3 Encounters:   24 (!) 158/85   24 132/86   24 134/84       NPO Status: Time of last liquid consumption: 0800 (sip with med)                        Time of last solid consumption: 2200                        Date of last liquid consumption: 24

## 2024-08-29 NOTE — H&P
Pre-operative Update of H&P:    I  have seen & examined Ms. Marilee Jones related solely to her hand and upper extremity conditions, prior to the scheduled procedure on the date of her surgery.  The indications for the planned surgical procedure & and her upper-extremity condition are unchanged.

## 2024-09-10 ENCOUNTER — TELEPHONE (OUTPATIENT)
Dept: FAMILY MEDICINE CLINIC | Age: 54
End: 2024-09-10

## 2024-09-11 ENCOUNTER — OFFICE VISIT (OUTPATIENT)
Dept: ORTHOPEDIC SURGERY | Age: 54
End: 2024-09-11

## 2024-09-11 VITALS — WEIGHT: 226 LBS | RESPIRATION RATE: 16 BRPM | HEIGHT: 63 IN | BODY MASS INDEX: 40.04 KG/M2

## 2024-09-11 DIAGNOSIS — Z98.890 STATUS POST CARPAL TUNNEL RELEASE: Primary | ICD-10-CM

## 2024-09-11 PROCEDURE — 99024 POSTOP FOLLOW-UP VISIT: CPT | Performed by: ORTHOPAEDIC SURGERY

## 2024-09-16 RX ORDER — KETOCONAZOLE 20 MG/G
CREAM TOPICAL
Qty: 30 G | Refills: 0 | Status: SHIPPED | OUTPATIENT
Start: 2024-09-16

## 2024-09-18 ENCOUNTER — TELEPHONE (OUTPATIENT)
Dept: ORTHOPEDIC SURGERY | Age: 54
End: 2024-09-18

## 2024-09-19 ENCOUNTER — ANESTHESIA (OUTPATIENT)
Dept: OPERATING ROOM | Age: 54
End: 2024-09-19
Payer: MEDICAID

## 2024-09-19 ENCOUNTER — ANESTHESIA EVENT (OUTPATIENT)
Dept: OPERATING ROOM | Age: 54
End: 2024-09-19
Payer: MEDICAID

## 2024-09-19 ENCOUNTER — HOSPITAL ENCOUNTER (OUTPATIENT)
Age: 54
Setting detail: OUTPATIENT SURGERY
Discharge: HOME | End: 2024-09-19
Attending: ORTHOPAEDIC SURGERY | Admitting: ORTHOPAEDIC SURGERY
Payer: MEDICAID

## 2024-09-19 VITALS
DIASTOLIC BLOOD PRESSURE: 83 MMHG | HEIGHT: 64 IN | OXYGEN SATURATION: 97 % | WEIGHT: 227 LBS | TEMPERATURE: 97.1 F | RESPIRATION RATE: 16 BRPM | HEART RATE: 68 BPM | BODY MASS INDEX: 38.76 KG/M2 | SYSTOLIC BLOOD PRESSURE: 163 MMHG

## 2024-09-19 LAB
GLUCOSE BLD-MCNC: 97 MG/DL (ref 70–99)
PERFORMED ON: NORMAL

## 2024-09-19 PROCEDURE — 3600000013 HC SURGERY LEVEL 3 ADDTL 15MIN: Performed by: ORTHOPAEDIC SURGERY

## 2024-09-19 PROCEDURE — 3700000001 HC ADD 15 MINUTES (ANESTHESIA): Performed by: ORTHOPAEDIC SURGERY

## 2024-09-19 PROCEDURE — 3600000003 HC SURGERY LEVEL 3 BASE: Performed by: ORTHOPAEDIC SURGERY

## 2024-09-19 PROCEDURE — 7100000001 HC PACU RECOVERY - ADDTL 15 MIN: Performed by: ORTHOPAEDIC SURGERY

## 2024-09-19 PROCEDURE — 3700000000 HC ANESTHESIA ATTENDED CARE: Performed by: ORTHOPAEDIC SURGERY

## 2024-09-19 PROCEDURE — 7100000011 HC PHASE II RECOVERY - ADDTL 15 MIN: Performed by: ORTHOPAEDIC SURGERY

## 2024-09-19 PROCEDURE — 6360000002 HC RX W HCPCS: Performed by: NURSE ANESTHETIST, CERTIFIED REGISTERED

## 2024-09-19 PROCEDURE — 2709999900 HC NON-CHARGEABLE SUPPLY: Performed by: ORTHOPAEDIC SURGERY

## 2024-09-19 PROCEDURE — 7100000000 HC PACU RECOVERY - FIRST 15 MIN: Performed by: ORTHOPAEDIC SURGERY

## 2024-09-19 PROCEDURE — 2580000003 HC RX 258: Performed by: ORTHOPAEDIC SURGERY

## 2024-09-19 PROCEDURE — 2500000003 HC RX 250 WO HCPCS: Performed by: ORTHOPAEDIC SURGERY

## 2024-09-19 PROCEDURE — A4217 STERILE WATER/SALINE, 500 ML: HCPCS | Performed by: ORTHOPAEDIC SURGERY

## 2024-09-19 PROCEDURE — 7100000010 HC PHASE II RECOVERY - FIRST 15 MIN: Performed by: ORTHOPAEDIC SURGERY

## 2024-09-19 PROCEDURE — 2500000003 HC RX 250 WO HCPCS: Performed by: NURSE ANESTHETIST, CERTIFIED REGISTERED

## 2024-09-19 PROCEDURE — 6360000002 HC RX W HCPCS: Performed by: ORTHOPAEDIC SURGERY

## 2024-09-19 RX ORDER — SODIUM CHLORIDE 0.9 % (FLUSH) 0.9 %
5-40 SYRINGE (ML) INJECTION PRN
Status: CANCELLED | OUTPATIENT
Start: 2024-09-19

## 2024-09-19 RX ORDER — LIDOCAINE HYDROCHLORIDE 10 MG/ML
1 INJECTION, SOLUTION EPIDURAL; INFILTRATION; INTRACAUDAL; PERINEURAL
Status: CANCELLED | OUTPATIENT
Start: 2024-09-19 | End: 2024-09-20

## 2024-09-19 RX ORDER — SODIUM CHLORIDE, SODIUM LACTATE, POTASSIUM CHLORIDE, CALCIUM CHLORIDE 600; 310; 30; 20 MG/100ML; MG/100ML; MG/100ML; MG/100ML
INJECTION, SOLUTION INTRAVENOUS CONTINUOUS
Status: CANCELLED | OUTPATIENT
Start: 2024-09-19

## 2024-09-19 RX ORDER — PROCHLORPERAZINE EDISYLATE 5 MG/ML
5 INJECTION INTRAMUSCULAR; INTRAVENOUS
Status: CANCELLED | OUTPATIENT
Start: 2024-09-19 | End: 2024-09-20

## 2024-09-19 RX ORDER — SODIUM CHLORIDE 0.9 % (FLUSH) 0.9 %
5-40 SYRINGE (ML) INJECTION EVERY 12 HOURS SCHEDULED
Status: CANCELLED | OUTPATIENT
Start: 2024-09-19

## 2024-09-19 RX ORDER — ONDANSETRON 2 MG/ML
4 INJECTION INTRAMUSCULAR; INTRAVENOUS
Status: CANCELLED | OUTPATIENT
Start: 2024-09-19 | End: 2024-09-20

## 2024-09-19 RX ORDER — PROPOFOL 10 MG/ML
INJECTION, EMULSION INTRAVENOUS
Status: DISCONTINUED | OUTPATIENT
Start: 2024-09-19 | End: 2024-09-19 | Stop reason: SDUPTHER

## 2024-09-19 RX ORDER — HYDRALAZINE HYDROCHLORIDE 20 MG/ML
10 INJECTION INTRAMUSCULAR; INTRAVENOUS
Status: CANCELLED | OUTPATIENT
Start: 2024-09-19

## 2024-09-19 RX ORDER — LABETALOL HYDROCHLORIDE 5 MG/ML
10 INJECTION, SOLUTION INTRAVENOUS
Status: CANCELLED | OUTPATIENT
Start: 2024-09-19

## 2024-09-19 RX ORDER — SODIUM CHLORIDE 9 MG/ML
INJECTION, SOLUTION INTRAVENOUS PRN
Status: CANCELLED | OUTPATIENT
Start: 2024-09-19

## 2024-09-19 RX ORDER — MAGNESIUM HYDROXIDE 1200 MG/15ML
LIQUID ORAL CONTINUOUS PRN
Status: COMPLETED | OUTPATIENT
Start: 2024-09-19 | End: 2024-09-19

## 2024-09-19 RX ORDER — FENTANYL CITRATE 50 UG/ML
25 INJECTION, SOLUTION INTRAMUSCULAR; INTRAVENOUS EVERY 5 MIN PRN
Status: CANCELLED | OUTPATIENT
Start: 2024-09-19

## 2024-09-19 RX ORDER — HYDROMORPHONE HYDROCHLORIDE 2 MG/ML
0.5 INJECTION, SOLUTION INTRAMUSCULAR; INTRAVENOUS; SUBCUTANEOUS EVERY 5 MIN PRN
Status: CANCELLED | OUTPATIENT
Start: 2024-09-19

## 2024-09-19 RX ORDER — LIDOCAINE HYDROCHLORIDE 20 MG/ML
INJECTION, SOLUTION EPIDURAL; INFILTRATION; INTRACAUDAL; PERINEURAL
Status: DISCONTINUED | OUTPATIENT
Start: 2024-09-19 | End: 2024-09-19 | Stop reason: SDUPTHER

## 2024-09-19 RX ORDER — SODIUM CHLORIDE 9 MG/ML
INJECTION, SOLUTION INTRAVENOUS CONTINUOUS
Status: DISCONTINUED | OUTPATIENT
Start: 2024-09-19 | End: 2024-09-19 | Stop reason: HOSPADM

## 2024-09-19 RX ORDER — LIDOCAINE HYDROCHLORIDE 10 MG/ML
INJECTION, SOLUTION EPIDURAL; INFILTRATION; INTRACAUDAL; PERINEURAL
Status: COMPLETED | OUTPATIENT
Start: 2024-09-19 | End: 2024-09-19

## 2024-09-19 RX ORDER — BUPIVACAINE HYDROCHLORIDE 5 MG/ML
INJECTION, SOLUTION EPIDURAL; INTRACAUDAL
Status: COMPLETED | OUTPATIENT
Start: 2024-09-19 | End: 2024-09-19

## 2024-09-19 RX ORDER — OXYCODONE HYDROCHLORIDE 5 MG/1
5 TABLET ORAL
Status: CANCELLED | OUTPATIENT
Start: 2024-09-19 | End: 2024-09-20

## 2024-09-19 RX ADMIN — PROPOFOL 140 MCG/KG/MIN: 10 INJECTION, EMULSION INTRAVENOUS at 12:14

## 2024-09-19 RX ADMIN — PROPOFOL 150 MG: 10 INJECTION, EMULSION INTRAVENOUS at 12:13

## 2024-09-19 RX ADMIN — SODIUM CHLORIDE: 9 INJECTION, SOLUTION INTRAVENOUS at 12:11

## 2024-09-19 RX ADMIN — LIDOCAINE HYDROCHLORIDE 60 MG: 20 INJECTION, SOLUTION EPIDURAL; INFILTRATION; INTRACAUDAL; PERINEURAL at 12:13

## 2024-09-19 ASSESSMENT — ENCOUNTER SYMPTOMS: SHORTNESS OF BREATH: 0

## 2024-09-19 ASSESSMENT — PAIN - FUNCTIONAL ASSESSMENT: PAIN_FUNCTIONAL_ASSESSMENT: 0-10

## 2024-09-25 ENCOUNTER — TELEPHONE (OUTPATIENT)
Dept: FAMILY MEDICINE CLINIC | Age: 54
End: 2024-09-25

## 2024-09-25 DIAGNOSIS — G25.81 RESTLESS LEG SYNDROME: ICD-10-CM

## 2024-09-26 DIAGNOSIS — G25.81 RESTLESS LEG SYNDROME: ICD-10-CM

## 2024-09-27 ENCOUNTER — OFFICE VISIT (OUTPATIENT)
Dept: ORTHOPEDIC SURGERY | Age: 54
End: 2024-09-27

## 2024-09-27 DIAGNOSIS — G56.02 LEFT CARPAL TUNNEL SYNDROME: Primary | ICD-10-CM

## 2024-09-27 PROCEDURE — 99024 POSTOP FOLLOW-UP VISIT: CPT | Performed by: PHYSICIAN ASSISTANT

## 2024-09-27 RX ORDER — CLONAZEPAM 2 MG/1
2 TABLET ORAL 2 TIMES DAILY PRN
Qty: 60 TABLET | Refills: 0 | Status: SHIPPED | OUTPATIENT
Start: 2024-09-27 | End: 2024-10-27

## 2024-10-22 DIAGNOSIS — K21.9 GASTROESOPHAGEAL REFLUX DISEASE WITHOUT ESOPHAGITIS: ICD-10-CM

## 2024-10-23 RX ORDER — IBUPROFEN 800 MG/1
800 TABLET, FILM COATED ORAL 3 TIMES DAILY
Qty: 90 TABLET | Refills: 0 | Status: SHIPPED | OUTPATIENT
Start: 2024-10-23

## 2024-10-23 RX ORDER — OMEPRAZOLE 40 MG/1
CAPSULE, DELAYED RELEASE ORAL
Qty: 90 CAPSULE | Refills: 0 | Status: SHIPPED | OUTPATIENT
Start: 2024-10-23

## 2024-11-06 ENCOUNTER — TELEPHONE (OUTPATIENT)
Dept: ORTHOPEDIC SURGERY | Age: 54
End: 2024-11-06

## 2024-11-06 NOTE — TELEPHONE ENCOUNTER
Spoke with pt. Discussed desensitization techniques with pt and she agreed to try them for about two weeks and will call back if needed.

## 2024-11-06 NOTE — TELEPHONE ENCOUNTER
General Question     Subject: BILATERAL HANDS   Patient and /or Facility Request: Marilee Jones   Contact Number: [phone]     PATIENT STATES THAT SHE IS HAVING SOME REALLY BAD NERVE PAIN IN BOTH OF HER HANDS TO THE POINT IT HURTS IF HER SLEEVE RUBS AGAINST IT WHERE HER INCISIONS ARE      SENT TO TRIAGE TEAM     PLEASE ADVISE

## 2024-11-08 DIAGNOSIS — G25.81 RESTLESS LEG SYNDROME: ICD-10-CM

## 2024-11-11 RX ORDER — CLONAZEPAM 2 MG/1
2 TABLET ORAL 2 TIMES DAILY PRN
Qty: 60 TABLET | Refills: 0 | Status: SHIPPED | OUTPATIENT
Start: 2024-11-11 | End: 2024-12-11

## 2024-11-11 NOTE — TELEPHONE ENCOUNTER
Medication:   Requested Prescriptions     Pending Prescriptions Disp Refills    clonazePAM (KLONOPIN) 2 MG tablet [Pharmacy Med Name: clonazePAM 2 MG TABLET] 60 tablet 0     Sig: TAKE 1 TABLET BY MOUTH 2 TIMES A DAY AS NEEDED FOR PAIN FOR UP TO 30 DAYS. MAXIMUM DAILY DOSE 2 TABLETS      Last Filled:  9/27/24    Patient Phone Number: [phone] (home)     Last appt: 8/27/2024   Next appt: Visit date not found    Last OARRS:       3/29/2019    12:34 PM   RX Monitoring   Attestation The Prescription Monitoring Report for this patient was reviewed today.     PDMP Monitoring:    Last PDMP [place] as Reviewed ([place]):  Review User Review Instant Review Result   BOGDAN WATERS 7/29/2024  3:14 PM Reviewed PDMP [1]     Preferred Pharmacy:   [facility] [id number] - [place], [place] - [address] [phone] - F [phone]  [address]  [facility] [address]  Phone: [phone] Fax: [phone]    [facility] - [address] -  [phone] - F [phone]  [address] [facility] [address]  Phone: [phone] Fax: [phone]    [facility] [id number] - [place], [place] - [address] [facility] -  [phone] - F [phone]  [address] [facility] [address]  Phone: [phone] Fax: [phone]    [facility] - [place], [place] - [address]  [place] [address] [phone] - F [phone]  [address]  [facility] [address]  Phone: [phone] Fax: [phone]

## 2024-11-13 ENCOUNTER — TELEPHONE (OUTPATIENT)
Dept: FAMILY MEDICINE CLINIC | Age: 54
End: 2024-11-13

## 2024-11-15 RX ORDER — MIRTAZAPINE 30 MG/1
30 TABLET, FILM COATED ORAL NIGHTLY
Qty: 90 TABLET | Refills: 0 | Status: SHIPPED | OUTPATIENT
Start: 2024-11-15

## 2024-11-22 RX ORDER — SERTRALINE HYDROCHLORIDE 100 MG/1
TABLET, FILM COATED ORAL
Qty: 180 TABLET | Refills: 0 | Status: SHIPPED | OUTPATIENT
Start: 2024-11-22

## 2024-11-22 RX ORDER — IBUPROFEN 800 MG/1
800 TABLET, FILM COATED ORAL 3 TIMES DAILY
Qty: 90 TABLET | Refills: 0 | Status: SHIPPED | OUTPATIENT
Start: 2024-11-22

## 2024-11-22 RX ORDER — KETOCONAZOLE 20 MG/G
CREAM TOPICAL
Qty: 30 G | Refills: 0 | Status: SHIPPED | OUTPATIENT
Start: 2024-11-22

## 2024-12-03 DIAGNOSIS — K21.9 GASTROESOPHAGEAL REFLUX DISEASE WITHOUT ESOPHAGITIS: ICD-10-CM

## 2024-12-04 RX ORDER — OMEPRAZOLE 40 MG/1
CAPSULE, DELAYED RELEASE ORAL
Qty: 90 CAPSULE | Refills: 0 | Status: SHIPPED | OUTPATIENT
Start: 2024-12-04

## 2024-12-30 DIAGNOSIS — G25.81 RESTLESS LEG SYNDROME: ICD-10-CM

## 2024-12-31 RX ORDER — CLONAZEPAM 2 MG/1
TABLET ORAL
Qty: 60 TABLET | OUTPATIENT
Start: 2024-12-31

## 2024-12-31 RX ORDER — SERTRALINE HYDROCHLORIDE 100 MG/1
TABLET, FILM COATED ORAL
Qty: 180 TABLET | Refills: 0 | OUTPATIENT
Start: 2024-12-31

## 2024-12-31 NOTE — TELEPHONE ENCOUNTER
Medication:   Requested Prescriptions     Pending Prescriptions Disp Refills    clonazePAM (KLONOPIN) 2 MG tablet [Pharmacy Med Name: clonazePAM 2 MG TABLET] 60 tablet      Sig: TAKE 1 TABLET BY MOUTH 2 TIMES A DAY AS NEEDED FOR ANXIETY FOR UP TO 30 DAYS. PATIENT WILL NEED AN APPOINTMENT FOR ADDITIONAL REFILLS (MAX DAILY AMOUNT: 4 MG)    sertraline (ZOLOFT) 100 MG tablet [Pharmacy Med Name: SERTRALINE  MG TABLET] 180 tablet 0     Sig: TAKE 2 TABLETS BY MOUTH DAILY      Last Filled:  11/11/2024    Patient Phone Number: [phone] (home)     Last appt: 8/27/2024   Next appt: Visit date not found    Last OARRS:       3/29/2019    12:34 PM   RX Monitoring   Attestation The Prescription Monitoring Report for this patient was reviewed today.     PDMP Monitoring:    Last PDMP [place] as Reviewed ([place]):  Review User Review Instant Review Result   BOGDAN WATERS 7/29/2024  3:14 PM Reviewed PDMP [1]     Preferred Pharmacy:   [facility] [id number] - [facility] [address] [phone] - F [phone]  [address]  [facility] [address]  Phone: [phone] Fax: [phone]    [facility] - [address] -  [phone] - F [phone]  [address] [facility] [address]  Phone: [phone] Fax: [phone]    [facility] [id number] - [place], [place] - [address] [facility] -  [phone] - F [phone]  [address] [facility] [address]  Phone: [phone] Fax: [phone]    [facility] - [place], [place] - [address] [facility] P [phone] - F [phone]  [address] [facility] [address]  Phone: [phone] Fax: [phone]

## 2025-01-06 DIAGNOSIS — G25.81 RESTLESS LEG SYNDROME: ICD-10-CM

## 2025-01-07 DIAGNOSIS — G25.81 RESTLESS LEG SYNDROME: ICD-10-CM

## 2025-01-07 RX ORDER — CLONAZEPAM 2 MG/1
2 TABLET ORAL 2 TIMES DAILY PRN
Qty: 60 TABLET | Refills: 0 | OUTPATIENT
Start: 2025-01-07 | End: 2025-02-06

## 2025-01-07 RX ORDER — CLONAZEPAM 2 MG/1
TABLET ORAL
Qty: 60 TABLET | OUTPATIENT
Start: 2025-01-07

## 2025-01-07 NOTE — TELEPHONE ENCOUNTER
Medication:   Requested Prescriptions     Pending Prescriptions Disp Refills    clonazePAM (KLONOPIN) 2 MG tablet 60 tablet 0     Sig: Take 1 tablet by mouth 2 times daily as needed for Anxiety for up to 30 days. Patient will need an appointment for additional refills Max Daily Amount: 4 mg      Last Filled:  11/11/24    Patient Phone Number: [phone] (home)     Last appt: 8/27/2024   Next appt: Visit date not found    Last OARRS:       3/29/2019    12:34 PM   RX Monitoring   Attestation The Prescription Monitoring Report for this patient was reviewed today.     PDMP Monitoring:    Last PDMP [place]ghia as Reviewed ([place]):  Review User Review Instant Review Result   BOGDAN WATERS 7/29/2024  3:14 PM Reviewed PDMP [1]     Preferred Pharmacy:   [facility] [id number] - [place], [place] - [address] [phone] - F [phone]  [address]  [facility] [address]  Phone: [phone] Fax: [phone]    [facility] - [address] -  [phone] - F [phone]  [address] [facility] [address]  Phone: [phone] Fax: [phone]    [facility] [id number] - [place], [place] - [address] [facility] -  [phone] - F [phone]  [address] [facility] [address]  Phone: [phone] Fax: [phone]    [facility] - [place], [place] - [address]  - P [phone] - F [phone]  [address]  [facility] [address]  Phone: [phone] Fax: [phone]

## 2025-01-08 ENCOUNTER — OFFICE VISIT (OUTPATIENT)
Dept: FAMILY MEDICINE CLINIC | Age: 55
End: 2025-01-08
Payer: MEDICAID

## 2025-01-08 VITALS
DIASTOLIC BLOOD PRESSURE: 86 MMHG | OXYGEN SATURATION: 98 % | SYSTOLIC BLOOD PRESSURE: 148 MMHG | HEART RATE: 102 BPM | WEIGHT: 239.8 LBS | TEMPERATURE: 98.1 F | BODY MASS INDEX: 41.16 KG/M2

## 2025-01-08 DIAGNOSIS — G25.81 RESTLESS LEG SYNDROME: ICD-10-CM

## 2025-01-08 DIAGNOSIS — F41.9 ANXIETY: Primary | ICD-10-CM

## 2025-01-08 DIAGNOSIS — K21.9 GASTROESOPHAGEAL REFLUX DISEASE WITHOUT ESOPHAGITIS: ICD-10-CM

## 2025-01-08 DIAGNOSIS — F32.1 CURRENT MODERATE EPISODE OF MAJOR DEPRESSIVE DISORDER WITHOUT PRIOR EPISODE (HCC): ICD-10-CM

## 2025-01-08 DIAGNOSIS — Z02.83 ENCOUNTER FOR DRUG SCREENING: ICD-10-CM

## 2025-01-08 LAB
AMPHETAMINES UR QL SCN>1000 NG/ML: ABNORMAL
BARBITURATES UR QL SCN>200 NG/ML: ABNORMAL
BENZODIAZ UR QL SCN>200 NG/ML: POSITIVE
CANNABINOIDS UR QL SCN>50 NG/ML: ABNORMAL
COCAINE UR QL SCN: ABNORMAL
DRUG SCREEN COMMENT UR-IMP: ABNORMAL
FENTANYL SCREEN, URINE: ABNORMAL
METHADONE UR QL SCN>300 NG/ML: ABNORMAL
OPIATES UR QL SCN>300 NG/ML: ABNORMAL
OXYCODONE UR QL SCN: ABNORMAL
PCP UR QL SCN>25 NG/ML: ABNORMAL
PH UR STRIP: 6 [PH]

## 2025-01-08 PROCEDURE — 99214 OFFICE O/P EST MOD 30 MIN: CPT | Performed by: NURSE PRACTITIONER

## 2025-01-08 RX ORDER — SERTRALINE HYDROCHLORIDE 100 MG/1
TABLET, FILM COATED ORAL
Qty: 180 TABLET | Refills: 0 | Status: SHIPPED | OUTPATIENT
Start: 2025-01-08

## 2025-01-08 RX ORDER — OMEPRAZOLE 40 MG/1
CAPSULE, DELAYED RELEASE ORAL
Qty: 90 CAPSULE | Refills: 0 | Status: SHIPPED | OUTPATIENT
Start: 2025-01-08

## 2025-01-08 RX ORDER — MIRTAZAPINE 30 MG/1
30 TABLET, FILM COATED ORAL NIGHTLY
Qty: 90 TABLET | Refills: 0 | Status: SHIPPED | OUTPATIENT
Start: 2025-01-08

## 2025-01-08 RX ORDER — CLONAZEPAM 2 MG/1
TABLET ORAL
Qty: 60 TABLET | Refills: 0 | Status: SHIPPED | OUTPATIENT
Start: 2025-01-08 | End: 2025-02-07

## 2025-01-08 RX ORDER — CLONAZEPAM 2 MG/1
2 TABLET ORAL 2 TIMES DAILY PRN
Qty: 60 TABLET | Refills: 0 | Status: SHIPPED | OUTPATIENT
Start: 2025-01-08 | End: 2025-02-07

## 2025-01-08 NOTE — PROGRESS NOTES
capsule 0    ibuprofen (ADVIL;MOTRIN) 800 MG tablet TAKE 1 TABLET BY MOUTH 3 TIMES A DAY 90 tablet 0    ketoconazole (NIZORAL) 2 % cream APPLY TO THE AFFECTED AREA(S) DAILY 30 g 0    Multiple Vitamins-Minerals (THERAPEUTIC MULTIVITAMIN-MINERALS) tablet Take 1 tablet by mouth daily      COLLAGEN PO Take by mouth (Patient not taking: Reported on 1/8/2025)      tretinoin (RETIN-A) 0.025 % cream Apply topically nightly. (Patient not taking: Reported on 8/27/2024) 20 g 2     No current facility-administered medications for this visit.      Review of Systems   Constitutional:  Negative for activity change, appetite change, chills, fatigue and fever.   Respiratory:  Negative for cough and shortness of breath.    Cardiovascular:  Negative for chest pain and palpitations.   Gastrointestinal:  Negative for diarrhea, nausea and vomiting.     Past medical, surgical, family and social history were reviewed and updated with the patient.    Objective:    BP (!) 148/86 (Site: Left Upper Arm, Position: Sitting, Cuff Size: Large Adult)   Pulse (!) 102   Temp 98.1 °F (36.7 °C) (Infrared)   Wt 108.8 kg (239 lb 12.8 oz)   LMP 03/07/2019   SpO2 98%   BMI 41.16 kg/m²   Weight - Scale: 108.8 kg (239 lb 12.8 oz)     BP Readings from Last 3 Encounters:   01/08/25 (!) 148/86   09/19/24 (!) 163/83   08/29/24 (!) 148/88     Wt Readings from Last 3 Encounters:   01/08/25 108.8 kg (239 lb 12.8 oz)   09/19/24 103 kg (227 lb)   09/11/24 102.5 kg (226 lb)     Physical Exam  Constitutional:       General: She is not in acute distress.     Appearance: She is well-developed.   HENT:      Head: Normocephalic and atraumatic.   Cardiovascular:      Rate and Rhythm: Normal rate and regular rhythm.      Heart sounds: Normal heart sounds, S1 normal and S2 normal.   Pulmonary:      Effort: Pulmonary effort is normal. No respiratory distress.      Breath sounds: Normal breath sounds.   Skin:     General: Skin is warm and dry.   Neurological:      Mental

## 2025-01-08 NOTE — TELEPHONE ENCOUNTER
Medication:   Requested Prescriptions     Pending Prescriptions Disp Refills    clonazePAM (KLONOPIN) 2 MG tablet [Pharmacy Med Name: clonazePAM 2 MG TABLET] 60 tablet      Sig: TAKE 1 TABLET BY MOUTH 2 TIMES A DAY AS NEEDED FOR ANXIETY FOR UP TO 30 DAYS. PATIENT WILL NEED AN APPOINTMENT FOR ADDITIONAL REFILLS (MAX DAILY AMOUNT: 4 MG)      Last Filled:  11/11/24  Provider out of office.     Patient Phone Number: [phone] (home)     Last appt: 8/27/2024   Next appt: 1/8/2025    Last OARRS:       3/29/2019    12:34 PM   RX Monitoring   Attestation The Prescription Monitoring Report for this patient was reviewed today.     PDMP Monitoring:    Last PDMP [place] as Reviewed ([place]):  Review User Review Instant Review Result   BOGDAN WATERS 1/8/2025  8:47 AM Reviewed PDMP [1]     Preferred Pharmacy:   [facility] [id number] - [facility] [address] [phone] - F [phone]  [address]  [place] [id number]  Phone: [phone] Fax: [phone]    [facility] - [address] -  [phone] - F [phone]  [address] [place] [address]  Phone: [phone] Fax: [phone]    [facility] [id number] - [place], [place] - [address] [facility] -  [phone] - F [phone]  [address] [place] [address]  Phone: [phone] Fax: [phone]    [facility] - [place], [place] - [address] [phone] - F [phone]  [address]  [facility] [address]  Phone: [phone] Fax: [phone]

## 2025-02-19 DIAGNOSIS — F41.9 ANXIETY: ICD-10-CM

## 2025-02-19 DIAGNOSIS — F32.1 CURRENT MODERATE EPISODE OF MAJOR DEPRESSIVE DISORDER WITHOUT PRIOR EPISODE (HCC): ICD-10-CM

## 2025-02-19 RX ORDER — MIRTAZAPINE 30 MG/1
30 TABLET, FILM COATED ORAL NIGHTLY
Qty: 90 TABLET | Refills: 0 | Status: SHIPPED | OUTPATIENT
Start: 2025-02-19 | End: 2025-02-19 | Stop reason: SDUPTHER

## 2025-02-19 RX ORDER — SERTRALINE HYDROCHLORIDE 100 MG/1
TABLET, FILM COATED ORAL
Qty: 180 TABLET | Refills: 0 | Status: SHIPPED | OUTPATIENT
Start: 2025-02-19

## 2025-02-19 RX ORDER — SERTRALINE HYDROCHLORIDE 100 MG/1
TABLET, FILM COATED ORAL
Qty: 180 TABLET | Refills: 0 | Status: SHIPPED | OUTPATIENT
Start: 2025-02-19 | End: 2025-02-19 | Stop reason: SDUPTHER

## 2025-02-19 RX ORDER — MIRTAZAPINE 30 MG/1
30 TABLET, FILM COATED ORAL NIGHTLY
Qty: 90 TABLET | Refills: 0 | Status: SHIPPED | OUTPATIENT
Start: 2025-02-19

## 2025-02-19 NOTE — TELEPHONE ENCOUNTER
Medication:   Requested Prescriptions      No prescriptions requested or ordered in this encounter      Seen Jacqueline PENDLETON    Patient Phone Number: [phone] (home)     Last appt: 1/8/2025   Next appt: 7/11/2025    Last OARRS:       3/29/2019    12:34 PM   RX Monitoring   Attestation The Prescription Monitoring Report for this patient was reviewed today.     PDMP Monitoring:    Last PDMP [place] as Reviewed ([place]):  Review User Review Instant Review Result   JOANNA BELLA 1/8/2025 10:37 AM Reviewed PDMP [1]     Preferred Pharmacy:   [facility] [id number] - [facility] [address]  - P [phone] - F [phone]  [address]  [facility] [address]  Phone: [phone] Fax: [phone]    [facility] - [address] -  [phone] - F [phone]  [address] [facility] [address]  Phone: [phone] Fax: [phone]    [facility] [id number] - [facility] [address] [facility] -  [phone] - F [phone]  [address] [place] [address]  Phone: [phone] Fax: [phone]    [facility] - [place], [place] - [address]  - P [phone] - F [phone]  [address]  [facility] [address]  Phone: [phone] Fax: [phone]

## 2025-02-20 DIAGNOSIS — G25.81 RESTLESS LEG SYNDROME: ICD-10-CM

## 2025-02-20 RX ORDER — CLONAZEPAM 2 MG/1
2 TABLET ORAL 2 TIMES DAILY PRN
Qty: 60 TABLET | Refills: 0 | Status: SHIPPED | OUTPATIENT
Start: 2025-02-20 | End: 2025-03-22

## 2025-02-20 NOTE — TELEPHONE ENCOUNTER
Medication:   Requested Prescriptions     Pending Prescriptions Disp Refills    clonazePAM (KLONOPIN) 2 MG tablet 60 tablet 0     Sig: Take 1 tablet by mouth 2 times daily as needed for Anxiety for up to 30 days. Patient will need an appointment for additional refills Max Daily Amount: 4 mg      Last Filled:  1/8/2025  Patient Phone Number: [phone] (home)     Last appt: 1/8/2025   Next appt: 7/11/2025    Last OARRS:       3/29/2019    12:34 PM   RX Monitoring   Attestation The Prescription Monitoring Report for this patient was reviewed today.     PDMP Monitoring:    Last PDMP [place]gh[place] as Reviewed ([place]):  Review User Review Instant Review Result   JOANNA BELLA 1/8/2025 10:37 AM Reviewed PDMP [1]     Preferred Pharmacy:   [facility] [id number] - [facility] [address] [phone] - F [phone]  [address]  [facility] [address]  Phone: [phone] Fax: [phone]    [facility] - [address] -  [phone] - F [phone]  [address] [facility] [address]  Phone: [phone] Fax: [phone]    [facility] [id number] - [place], [place] - [address] [facility] -  [phone] - F [phone]  [address] [facility] [address]  Phone: [phone] Fax: [phone]    [facility] - [place], [place] - [address] - P [phone] - F [phone]  [address]  [facility] [address]  Phone: [phone] Fax: [phone]

## 2025-03-28 ENCOUNTER — HOSPITAL ENCOUNTER (OUTPATIENT)
Dept: WOMENS IMAGING | Age: 55
Discharge: HOME | End: 2025-03-28
Payer: MEDICAID

## 2025-03-28 VITALS — BODY MASS INDEX: 40.12 KG/M2 | WEIGHT: 235 LBS | HEIGHT: 64 IN

## 2025-03-28 DIAGNOSIS — Z12.31 BREAST CANCER SCREENING BY MAMMOGRAM: ICD-10-CM

## 2025-03-28 PROCEDURE — 77063 BREAST TOMOSYNTHESIS BI: CPT

## 2025-03-31 ENCOUNTER — RESULTS FOLLOW-UP (OUTPATIENT)
Dept: FAMILY MEDICINE CLINIC | Age: 55
End: 2025-03-31

## 2025-04-04 ENCOUNTER — TELEPHONE (OUTPATIENT)
Dept: FAMILY MEDICINE CLINIC | Age: 55
End: 2025-04-04

## 2025-04-04 NOTE — TELEPHONE ENCOUNTER
PT is calling because she is wanting to switch providers. I did inform her that we could not do that until someone talks with  and I also told her that  is not in office until Wednesday. Please reach out to Marilee.    Please Advise.

## 2025-04-07 DIAGNOSIS — G25.81 RESTLESS LEG SYNDROME: ICD-10-CM

## 2025-04-07 RX ORDER — CLONAZEPAM 2 MG/1
2 TABLET ORAL 2 TIMES DAILY PRN
Qty: 60 TABLET | Refills: 0 | Status: SHIPPED | OUTPATIENT
Start: 2025-04-07 | End: 2025-05-07

## 2025-04-07 NOTE — TELEPHONE ENCOUNTER
Medication:   Requested Prescriptions     Pending Prescriptions Disp Refills    clonazePAM (KLONOPIN) 2 MG tablet 60 tablet 0     Sig: Take 1 tablet by mouth 2 times daily as needed for Anxiety for up to 30 days. Patient will need an appointment for additional refills Max Daily Amount: 4 mg      Last Filled:  2/20    Patient Phone Number: [phone] (home)     Last appt: 1/8/2025   Next appt: 7/14/2025    Last OARRS:       3/29/2019    12:34 PM   RX Monitoring   Attestation The Prescription Monitoring Report for this patient was reviewed today.     PDMP Monitoring:    Last PDMP [place] as Reviewed ([place]):  Review User Review Instant Review Result   EVELIN, BOGDAN TOMLIN 2/20/2025  2:49 PM Reviewed PDMP [1]     Preferred Pharmacy:   [facility] [id number] - [place] - [address] [phone] - F [phone]  [address] [place] [address]  Phone: [phone] Fax: [phone]

## 2025-04-07 NOTE — TELEPHONE ENCOUNTER
Pt called in stating she hasn't heard anything from us about switching providers.  She does not want to see Jacqueline Mondragon.  She also is requesting a refill on clonazePAM (KLONOPIN) 2 MG tablet . Please advise

## 2025-04-07 NOTE — TELEPHONE ENCOUNTER
Please call and advise patient that we do not allow patients to switch providers. Will send refill request to on call provider.

## 2025-04-16 NOTE — TELEPHONE ENCOUNTER
Called pt to let her know about switching providers.  She stated that is fine until she can find someone else.

## 2025-05-18 DIAGNOSIS — F32.1 CURRENT MODERATE EPISODE OF MAJOR DEPRESSIVE DISORDER WITHOUT PRIOR EPISODE (HCC): ICD-10-CM

## 2025-05-18 DIAGNOSIS — F41.9 ANXIETY: ICD-10-CM

## 2025-05-19 RX ORDER — SERTRALINE HYDROCHLORIDE 100 MG/1
200 TABLET, FILM COATED ORAL DAILY
Qty: 180 TABLET | Refills: 0 | Status: SHIPPED | OUTPATIENT
Start: 2025-05-19

## 2025-06-06 ENCOUNTER — TELEPHONE (OUTPATIENT)
Dept: FAMILY MEDICINE CLINIC | Age: 55
End: 2025-06-06

## 2025-06-06 ENCOUNTER — OFFICE VISIT (OUTPATIENT)
Dept: FAMILY MEDICINE CLINIC | Age: 55
End: 2025-06-06
Payer: MEDICAID

## 2025-06-06 VITALS
WEIGHT: 246 LBS | SYSTOLIC BLOOD PRESSURE: 148 MMHG | TEMPERATURE: 98 F | HEART RATE: 97 BPM | BODY MASS INDEX: 42.23 KG/M2 | DIASTOLIC BLOOD PRESSURE: 70 MMHG | OXYGEN SATURATION: 98 %

## 2025-06-06 DIAGNOSIS — L98.9 SKIN LESION OF RIGHT LEG: Primary | ICD-10-CM

## 2025-06-06 PROCEDURE — 99213 OFFICE O/P EST LOW 20 MIN: CPT | Performed by: FAMILY MEDICINE

## 2025-06-06 SDOH — ECONOMIC STABILITY: FOOD INSECURITY: WITHIN THE PAST 12 MONTHS, YOU WORRIED THAT YOUR FOOD WOULD RUN OUT BEFORE YOU GOT MONEY TO BUY MORE.: NEVER TRUE

## 2025-06-06 SDOH — ECONOMIC STABILITY: FOOD INSECURITY: WITHIN THE PAST 12 MONTHS, THE FOOD YOU BOUGHT JUST DIDN'T LAST AND YOU DIDN'T HAVE MONEY TO GET MORE.: NEVER TRUE

## 2025-06-06 ASSESSMENT — PATIENT HEALTH QUESTIONNAIRE - PHQ9
SUM OF ALL RESPONSES TO PHQ QUESTIONS 1-9: 0
1. LITTLE INTEREST OR PLEASURE IN DOING THINGS: NOT AT ALL
SUM OF ALL RESPONSES TO PHQ QUESTIONS 1-9: 0
SUM OF ALL RESPONSES TO PHQ QUESTIONS 1-9: 0
2. FEELING DOWN, DEPRESSED OR HOPELESS: NOT AT ALL
SUM OF ALL RESPONSES TO PHQ QUESTIONS 1-9: 0

## 2025-06-06 NOTE — PROGRESS NOTES
Patient is here today to get a spot on her right lower leg.  Said she noticed it about a couple of weeks ago, and that it \"may\" have gotten a little darker since.   Not painful or itching.     Vitals:    06/06/25 1432   BP: (!) 148/70   BP Site: Left Upper Arm   Patient Position: Sitting   BP Cuff Size: Large Adult   Pulse: 97   Temp: 98 °F (36.7 °C)   TempSrc: Infrared   SpO2: 98%   Weight: 111.6 kg (246 lb)     Wt Readings from Last 3 Encounters:   06/06/25 111.6 kg (246 lb)   03/28/25 106.6 kg (235 lb)   01/08/25 108.8 kg (239 lb 12.8 oz)     Body mass index is 42.23 kg/m².      6/6/2025     2:31 PM 3/4/2024     2:44 PM 3/28/2023    12:57 PM 10/24/2022     4:32 PM 12/27/2021     6:04 PM 6/8/2021     7:41 AM 10/18/2017     3:45 PM   PHQ Scores   PHQ2 Score 0 2 0 0 0 1 0   PHQ9 Score 0 9 0 0 0 1 0       Interpretation of Total Score Depression Severity: 1-4 = Minimal depression, 5-9 = Mild depression, 10-14 = Moderate depression, 15-19 = Moderately severe depression, 20-27 = Severe depression       GEN: Alert and oriented x 4 NAD, affect appropriate and obese, well hydrated, well developed.  Right upper shin with [id number] red irreg C shaped lesion  See picture in medai      ASSESSMENT AND PLAN:       Marilee was seen today for lesion(s).    Diagnoses and all orders for this visit:    Skin lesion of right leg      See derm        Portions of Note per  Isa Carpio CMA AAMA with corrections and edits per Rosa Strange MD.  I agree with entirety of note and was present and performed history and physical.  I also confirm that the note above accurately reflects all work, treatment, procedures, and medical decision making performed by me, Rosa Strange MD

## 2025-06-09 ENCOUNTER — TELEPHONE (OUTPATIENT)
Dept: FAMILY MEDICINE CLINIC | Age: 55
End: 2025-06-09

## 2025-06-09 DIAGNOSIS — L98.9 SKIN LESION: Primary | ICD-10-CM

## 2025-06-09 NOTE — TELEPHONE ENCOUNTER
Patient called saying that a referral was supposed to be faxed over to     Fax #:[phone]    She would like a call confirming it was sent.     [phone]

## 2025-06-20 ENCOUNTER — OFFICE VISIT (OUTPATIENT)
Dept: FAMILY MEDICINE CLINIC | Age: 55
End: 2025-06-20

## 2025-06-20 VITALS
BODY MASS INDEX: 42.78 KG/M2 | DIASTOLIC BLOOD PRESSURE: 92 MMHG | HEART RATE: 84 BPM | SYSTOLIC BLOOD PRESSURE: 132 MMHG | OXYGEN SATURATION: 94 % | TEMPERATURE: 97.4 F | RESPIRATION RATE: 16 BRPM | WEIGHT: 249.25 LBS

## 2025-06-20 DIAGNOSIS — Z00.00 ANNUAL PHYSICAL EXAM: Primary | ICD-10-CM

## 2025-06-20 DIAGNOSIS — Z13.6 SCREENING FOR CARDIOVASCULAR CONDITION: ICD-10-CM

## 2025-06-20 DIAGNOSIS — F32.1 CURRENT MODERATE EPISODE OF MAJOR DEPRESSIVE DISORDER WITHOUT PRIOR EPISODE (HCC): ICD-10-CM

## 2025-06-20 DIAGNOSIS — Z12.11 SCREENING FOR MALIGNANT NEOPLASM OF COLON: ICD-10-CM

## 2025-06-20 DIAGNOSIS — E55.9 VITAMIN D DEFICIENCY: ICD-10-CM

## 2025-06-20 DIAGNOSIS — Z13.1 SCREENING FOR DIABETES MELLITUS: ICD-10-CM

## 2025-06-20 DIAGNOSIS — L98.9 SKIN LESION OF RIGHT LEG: ICD-10-CM

## 2025-06-20 DIAGNOSIS — E78.2 MIXED HYPERLIPIDEMIA: ICD-10-CM

## 2025-06-20 DIAGNOSIS — G25.81 RESTLESS LEG SYNDROME: ICD-10-CM

## 2025-06-20 DIAGNOSIS — K21.9 GASTROESOPHAGEAL REFLUX DISEASE WITHOUT ESOPHAGITIS: ICD-10-CM

## 2025-06-20 DIAGNOSIS — Z13.29 SCREENING FOR HYPOTHYROIDISM: ICD-10-CM

## 2025-06-20 DIAGNOSIS — Z90.3 S/P GASTRIC SLEEVE PROCEDURE: ICD-10-CM

## 2025-06-20 DIAGNOSIS — F41.9 ANXIETY: ICD-10-CM

## 2025-06-20 PROBLEM — D50.9 IRON DEFICIENCY ANEMIA: Status: RESOLVED | Noted: 2019-08-01 | Resolved: 2025-06-20

## 2025-06-20 PROBLEM — G56.01 RIGHT CARPAL TUNNEL SYNDROME: Status: RESOLVED | Noted: 2024-08-16 | Resolved: 2025-06-20

## 2025-06-20 PROBLEM — G56.02 LEFT CARPAL TUNNEL SYNDROME: Status: RESOLVED | Noted: 2024-08-16 | Resolved: 2025-06-20

## 2025-06-20 RX ORDER — SERTRALINE HYDROCHLORIDE 100 MG/1
200 TABLET, FILM COATED ORAL DAILY
Qty: 180 TABLET | Refills: 3 | Status: SHIPPED | OUTPATIENT
Start: 2025-06-20

## 2025-06-20 RX ORDER — OMEPRAZOLE 40 MG/1
CAPSULE, DELAYED RELEASE ORAL
Qty: 90 CAPSULE | Refills: 3 | Status: SHIPPED | OUTPATIENT
Start: 2025-06-20

## 2025-06-20 RX ORDER — CLONAZEPAM 2 MG/1
2 TABLET ORAL 2 TIMES DAILY PRN
Qty: 60 TABLET | Refills: 0 | Status: SHIPPED | OUTPATIENT
Start: 2025-06-20 | End: 2025-07-20

## 2025-06-20 RX ORDER — MIRTAZAPINE 30 MG/1
30 TABLET, FILM COATED ORAL NIGHTLY
Qty: 90 TABLET | Refills: 3 | Status: SHIPPED | OUTPATIENT
Start: 2025-06-20

## 2025-06-20 NOTE — PROGRESS NOTES
Drivers of Health     Financial Resource Strain: Low Risk  (5/20/2024)    Overall Financial Resource Strain (CARDIA)     Difficulty of Paying Living Expenses: Not hard at all   Food Insecurity: No Food Insecurity (6/6/2025)    Hunger Vital Sign     Worried About Running Out of Food in the Last Year: Never true     Ran Out of Food in the Last Year: Never true   Transportation Needs: No Transportation Needs (6/6/2025)    PRAPARE - Transportation     Lack of Transportation (Medical): No     Lack of Transportation (Non-Medical): No   Physical Activity: Unknown (7/16/2024)    Exercise Vital Sign     Days of Exercise per Week: 0 days     Minutes of Exercise per Session: Not on file   Stress: Not on file   Social Connections: Not on file   Intimate Partner Violence: Not on file   Housing Stability: Low Risk  (6/6/2025)    Housing Stability Vital Sign     Unable to Pay for Housing in the Last Year: No     Number of Times Moved in the Last Year: 0     Homeless in the Last Year: No     Review of Systems:  A comprehensive review of systems was negative except for what is noted in the HPI.    Physical Exam:  Vitals:    06/20/25 0752 06/20/25 0845   BP: (!) 142/86 (!) 132/92   BP Site: Right Upper Arm Right Upper Arm   Patient Position: Sitting Sitting   BP Cuff Size: Large Adult Large Adult   Pulse: 84    Resp: 16    Temp: 97.4 °F (36.3 °C)    TempSrc: Infrared    SpO2: 94%    Weight: 113.1 kg (249 lb 4 oz)       Body mass index is 42.78 kg/m².  Physical Exam  Constitutional:       General: She is not in acute distress.     Appearance: Normal appearance. She is well-developed.   HENT:      Head: Normocephalic and atraumatic.      Right Ear: Hearing, tympanic membrane, ear canal and external ear normal.      Left Ear: Hearing, tympanic membrane, ear canal and external ear normal.      Nose: Nose normal. No mucosal edema.      Mouth/Throat:      Pharynx: Uvula midline.      Tonsils: No tonsillar exudate. 1+ on the right. 1+ on

## 2025-06-23 DIAGNOSIS — G25.81 RESTLESS LEG SYNDROME: ICD-10-CM

## 2025-06-24 RX ORDER — CLONAZEPAM 2 MG/1
TABLET ORAL
Qty: 60 TABLET | OUTPATIENT
Start: 2025-06-24

## 2025-06-24 NOTE — TELEPHONE ENCOUNTER
Medication:   Requested Prescriptions     Pending Prescriptions Disp Refills    clonazePAM (KLONOPIN) 2 MG tablet [Pharmacy Med Name: clonazePAM 2 MG TABLET] 60 tablet      Sig: TAKE 1 TABLET BY MOUTH 2 TIMES A DAY AS NEEDED FOR ANXIETY ** MUST MAKE APPOINTMENT FOR ADDITIONAL REFILLS**      Last Filled:  6/20/25    Patient Phone Number: [phone] (home)     Last appt: 6/20/2025   Next appt: Visit date not found    Last OARRS:       3/29/2019    12:34 PM   RX Monitoring   Attestation The Prescription Monitoring Report for this patient was reviewed today.     PDMP Monitoring:    Last PDMP [place] as Reviewed ([place]):  Review User Review Instant Review Result   BOGDAN WATERS 6/20/2025  8:30 AM Reviewed PDMP [1]     Preferred Pharmacy:   [id number] - [place], [place] - [address] [phone] - F [phone]  [address]  [facility] [address]  Phone: [phone] Fax: [phone]    [facility] - [address] -  [phone] - F [phone]  [address] [facility] [address]  Phone: [phone] Fax: [phone]    [facility] [id number] - [place], [place] - [address] [facility] -  [phone] - F [phone]  [address] [facility] [address]  Phone: [phone] Fax: [phone]    [id number] - [place], [place] - [address]  - P [phone] - F [phone]  [address]  [facility] [address]  Phone: [phone] Fax: [phone]

## 2025-07-02 ENCOUNTER — RESULTS FOLLOW-UP (OUTPATIENT)
Dept: FAMILY MEDICINE CLINIC | Age: 55
End: 2025-07-02

## 2025-07-02 LAB — NONINV COLON CA DNA+OCC BLD SCRN STL QL: NEGATIVE

## 2025-07-10 ENCOUNTER — TELEPHONE (OUTPATIENT)
Dept: FAMILY MEDICINE CLINIC | Age: 55
End: 2025-07-10

## 2025-07-10 DIAGNOSIS — L98.9 SKIN LESION: Primary | ICD-10-CM

## 2025-07-10 NOTE — TELEPHONE ENCOUNTER
Patient called and she is unable to get in until April 2026. She is wanting  to go to [facility] [phone]. She stated you might be able to call to get her in sooner. Please give her a call to let her know at [phone].     Please advise.

## 2025-09-02 ENCOUNTER — TELEPHONE (OUTPATIENT)
Dept: FAMILY MEDICINE CLINIC | Age: 55
End: 2025-09-02

## (undated) DEVICE — STERILE LATEX POWDER-FREE SURGICAL GLOVESWITH NITRILE COATING: Brand: PROTEXIS

## (undated) DEVICE — SOLUTION IRRIG 500ML 0.9% SOD CHLO USP POUR PLAS BTL

## (undated) DEVICE — WRAP COHESIVE W2INXL5YD TAN SELF ADH BNDG HND NON STERILE TEAR CARING

## (undated) DEVICE — UNDERGLOVE SURG SZ 8 FNGR THK0.21MIL GRN LTX BEAD CUF

## (undated) DEVICE — WILLIS PACK: Brand: MEDLINE INDUSTRIES, INC.